# Patient Record
Sex: FEMALE | Race: AMERICAN INDIAN OR ALASKA NATIVE | NOT HISPANIC OR LATINO | URBAN - METROPOLITAN AREA
[De-identification: names, ages, dates, MRNs, and addresses within clinical notes are randomized per-mention and may not be internally consistent; named-entity substitution may affect disease eponyms.]

---

## 2020-04-01 ENCOUNTER — INPATIENT (INPATIENT)
Facility: HOSPITAL | Age: 50
LOS: 2 days | Discharge: HOME | End: 2020-04-04
Attending: HOSPITALIST | Admitting: HOSPITALIST
Payer: MEDICAID

## 2020-04-01 VITALS
TEMPERATURE: 103 F | DIASTOLIC BLOOD PRESSURE: 87 MMHG | HEART RATE: 122 BPM | SYSTOLIC BLOOD PRESSURE: 150 MMHG | RESPIRATION RATE: 18 BRPM | OXYGEN SATURATION: 96 %

## 2020-04-01 LAB
ANION GAP SERPL CALC-SCNC: 20 MMO/L — HIGH (ref 7–14)
BUN SERPL-MCNC: 10 MG/DL — SIGNIFICANT CHANGE UP (ref 7–23)
CALCIUM SERPL-MCNC: 9.5 MG/DL — SIGNIFICANT CHANGE UP (ref 8.4–10.5)
CHLORIDE SERPL-SCNC: 90 MMOL/L — LOW (ref 98–107)
CO2 SERPL-SCNC: 21 MMOL/L — LOW (ref 22–31)
CREAT SERPL-MCNC: 0.38 MG/DL — LOW (ref 0.5–1.3)
GLUCOSE SERPL-MCNC: 283 MG/DL — HIGH (ref 70–99)
HCT VFR BLD CALC: 41.2 % — SIGNIFICANT CHANGE UP (ref 34.5–45)
HGB BLD-MCNC: 14.5 G/DL — SIGNIFICANT CHANGE UP (ref 11.5–15.5)
MCHC RBC-ENTMCNC: 29.8 PG — SIGNIFICANT CHANGE UP (ref 27–34)
MCHC RBC-ENTMCNC: 35.2 % — SIGNIFICANT CHANGE UP (ref 32–36)
MCV RBC AUTO: 84.6 FL — SIGNIFICANT CHANGE UP (ref 80–100)
NRBC # FLD: 0 K/UL — SIGNIFICANT CHANGE UP (ref 0–0)
PLATELET # BLD AUTO: 266 K/UL — SIGNIFICANT CHANGE UP (ref 150–400)
PMV BLD: 9.2 FL — SIGNIFICANT CHANGE UP (ref 7–13)
POTASSIUM SERPL-MCNC: 2.9 MMOL/L — CRITICAL LOW (ref 3.5–5.3)
POTASSIUM SERPL-SCNC: 2.9 MMOL/L — CRITICAL LOW (ref 3.5–5.3)
RBC # BLD: 4.87 M/UL — SIGNIFICANT CHANGE UP (ref 3.8–5.2)
RBC # FLD: 11.8 % — SIGNIFICANT CHANGE UP (ref 10.3–14.5)
SODIUM SERPL-SCNC: 131 MMOL/L — LOW (ref 135–145)
WBC # BLD: 8.51 K/UL — SIGNIFICANT CHANGE UP (ref 3.8–10.5)
WBC # FLD AUTO: 8.51 K/UL — SIGNIFICANT CHANGE UP (ref 3.8–10.5)

## 2020-04-01 PROCEDURE — 71045 X-RAY EXAM CHEST 1 VIEW: CPT | Mod: 26

## 2020-04-01 RX ORDER — POTASSIUM CHLORIDE 20 MEQ
10 PACKET (EA) ORAL
Refills: 0 | Status: COMPLETED | OUTPATIENT
Start: 2020-04-01 | End: 2020-04-01

## 2020-04-01 RX ORDER — ACETAMINOPHEN 500 MG
650 TABLET ORAL ONCE
Refills: 0 | Status: COMPLETED | OUTPATIENT
Start: 2020-04-01 | End: 2020-04-01

## 2020-04-01 RX ORDER — AZITHROMYCIN 500 MG/1
1 TABLET, FILM COATED ORAL
Qty: 5 | Refills: 0
Start: 2020-04-01 | End: 2020-04-05

## 2020-04-01 RX ORDER — POTASSIUM CHLORIDE 20 MEQ
40 PACKET (EA) ORAL ONCE
Refills: 0 | Status: COMPLETED | OUTPATIENT
Start: 2020-04-01 | End: 2020-04-01

## 2020-04-01 RX ORDER — SODIUM CHLORIDE 9 MG/ML
1000 INJECTION INTRAMUSCULAR; INTRAVENOUS; SUBCUTANEOUS ONCE
Refills: 0 | Status: COMPLETED | OUTPATIENT
Start: 2020-04-01 | End: 2020-04-01

## 2020-04-01 RX ADMIN — Medication 100 MILLIEQUIVALENT(S): at 21:52

## 2020-04-01 RX ADMIN — Medication 40 MILLIEQUIVALENT(S): at 16:21

## 2020-04-01 RX ADMIN — SODIUM CHLORIDE 1000 MILLILITER(S): 9 INJECTION INTRAMUSCULAR; INTRAVENOUS; SUBCUTANEOUS at 12:35

## 2020-04-01 RX ADMIN — Medication 100 MILLIEQUIVALENT(S): at 18:16

## 2020-04-01 RX ADMIN — Medication 100 MILLIEQUIVALENT(S): at 16:21

## 2020-04-01 RX ADMIN — Medication 650 MILLIGRAM(S): at 12:22

## 2020-04-01 NOTE — ED ADULT TRIAGE NOTE - CHIEF COMPLAINT QUOTE
fever cough body aches x one week  Feeling worse now, also dizzy and headache  Took advil at 10 am  Type 2 DM takes Metformin

## 2020-04-01 NOTE — ED ADULT NURSE NOTE - NSIMPLEMENTINTERV_GEN_ALL_ED
Implemented All Fall Risk Interventions:  [place] to call system. Call bell, personal items and telephone within reach. Instruct patient to call for assistance. Room bathroom lighting operational. Non-slip footwear when patient is off stretcher. Physically safe environment: no spills, clutter or unnecessary equipment. Stretcher in lowest position, wheels locked, appropriate side rails in place. Provide visual cue, wrist band, yellow gown, etc. Monitor gait and stability. Monitor for mental status changes and reorient to person, place, and time. Review medications for side effects contributing to fall risk. Reinforce activity limits and safety measures with patient and family.

## 2020-04-01 NOTE — ED PROVIDER NOTE - PROGRESS NOTE DETAILS
transfer to adult side for k k-2.9  zithromax sent over to pharmacy SOLO Epstein: received sign out for Dr Reaves, pt with low K needs repletion and likely discharge home. SOLO Epstein: pt signed out to Dr Sibley to repeat labs after K runs and reassess. Patient noted to be tachypnic to 35-40, tachycardic to 110, and hypoxic to 90-91% on room air. Improved to 100% on 2-3 L NC. Given presentation, CXR, and vitals - likely infectious, COVID? Will admit for further care  Chavo Sibley MD, PGY3 Emergency Medicine

## 2020-04-01 NOTE — ED ADULT NURSE NOTE - OBJECTIVE STATEMENT
Pt presenting to ER AxOx4 ambulatory at baseline with c.o weakness, cough, SOB. PT states she was tested for covid-19 but has not received results back. On arrival to ED pt's breathing is even and unlabored. Pt receiving IV potassium as per MD orders. Pt NSR on cardiac monitor. MD at bedside, will continue to monitor.

## 2020-04-01 NOTE — ED PROVIDER NOTE - OBJECTIVE STATEMENT
50 y/o female with h/o DM presents with fever for one week, cough and diarrhea  no sick contacts  dec po but joseph fluids  c/o feeling week

## 2020-04-02 DIAGNOSIS — E87.1 HYPO-OSMOLALITY AND HYPONATREMIA: ICD-10-CM

## 2020-04-02 DIAGNOSIS — Z29.9 ENCOUNTER FOR PROPHYLACTIC MEASURES, UNSPECIFIED: ICD-10-CM

## 2020-04-02 DIAGNOSIS — Z02.9 ENCOUNTER FOR ADMINISTRATIVE EXAMINATIONS, UNSPECIFIED: ICD-10-CM

## 2020-04-02 DIAGNOSIS — E11.9 TYPE 2 DIABETES MELLITUS WITHOUT COMPLICATIONS: ICD-10-CM

## 2020-04-02 DIAGNOSIS — R05 COUGH: ICD-10-CM

## 2020-04-02 DIAGNOSIS — E87.6 HYPOKALEMIA: ICD-10-CM

## 2020-04-02 DIAGNOSIS — J96.91 RESPIRATORY FAILURE, UNSPECIFIED WITH HYPOXIA: ICD-10-CM

## 2020-04-02 DIAGNOSIS — R68.89 OTHER GENERAL SYMPTOMS AND SIGNS: ICD-10-CM

## 2020-04-02 LAB
ALBUMIN SERPL ELPH-MCNC: 3.2 G/DL — LOW (ref 3.3–5)
ALP SERPL-CCNC: 44 U/L — SIGNIFICANT CHANGE UP (ref 40–120)
ALT FLD-CCNC: 52 U/L — HIGH (ref 4–33)
ANION GAP SERPL CALC-SCNC: 15 MMO/L — HIGH (ref 7–14)
ANION GAP SERPL CALC-SCNC: 19 MMO/L — HIGH (ref 7–14)
APTT BLD: 25.7 SEC — LOW (ref 27.5–36.3)
AST SERPL-CCNC: 36 U/L — HIGH (ref 4–32)
BASOPHILS # BLD AUTO: 0.01 K/UL — SIGNIFICANT CHANGE UP (ref 0–0.2)
BASOPHILS NFR BLD AUTO: 0.1 % — SIGNIFICANT CHANGE UP (ref 0–2)
BASOPHILS NFR SPEC: 0.9 % — SIGNIFICANT CHANGE UP (ref 0–2)
BILIRUB SERPL-MCNC: 0.4 MG/DL — SIGNIFICANT CHANGE UP (ref 0.2–1.2)
BLASTS # FLD: 0 % — SIGNIFICANT CHANGE UP (ref 0–0)
BUN SERPL-MCNC: 9 MG/DL — SIGNIFICANT CHANGE UP (ref 7–23)
BUN SERPL-MCNC: 9 MG/DL — SIGNIFICANT CHANGE UP (ref 7–23)
BURR CELLS BLD QL SMEAR: PRESENT — SIGNIFICANT CHANGE UP
CALCIUM SERPL-MCNC: 8.2 MG/DL — LOW (ref 8.4–10.5)
CALCIUM SERPL-MCNC: 8.5 MG/DL — SIGNIFICANT CHANGE UP (ref 8.4–10.5)
CHLORIDE SERPL-SCNC: 96 MMOL/L — LOW (ref 98–107)
CHLORIDE SERPL-SCNC: 98 MMOL/L — SIGNIFICANT CHANGE UP (ref 98–107)
CK SERPL-CCNC: 43 U/L — SIGNIFICANT CHANGE UP (ref 25–170)
CO2 SERPL-SCNC: 13 MMOL/L — LOW (ref 22–31)
CO2 SERPL-SCNC: 18 MMOL/L — LOW (ref 22–31)
CREAT SERPL-MCNC: 0.32 MG/DL — LOW (ref 0.5–1.3)
CREAT SERPL-MCNC: 0.33 MG/DL — LOW (ref 0.5–1.3)
CRP SERPL-MCNC: 123.1 MG/L — HIGH
D DIMER BLD IA.RAPID-MCNC: 321 NG/ML — SIGNIFICANT CHANGE UP
EOSINOPHIL # BLD AUTO: 0 K/UL — SIGNIFICANT CHANGE UP (ref 0–0.5)
EOSINOPHIL NFR BLD AUTO: 0 % — SIGNIFICANT CHANGE UP (ref 0–6)
EOSINOPHIL NFR FLD: 0 % — SIGNIFICANT CHANGE UP (ref 0–6)
ERYTHROCYTE [SEDIMENTATION RATE] IN BLOOD: 86 MM/HR — HIGH (ref 4–25)
FERRITIN SERPL-MCNC: 457.4 NG/ML — HIGH (ref 15–150)
GIANT PLATELETS BLD QL SMEAR: PRESENT — SIGNIFICANT CHANGE UP
GLUCOSE BLDC GLUCOMTR-MCNC: 267 MG/DL — HIGH (ref 70–99)
GLUCOSE BLDC GLUCOMTR-MCNC: 274 MG/DL — HIGH (ref 70–99)
GLUCOSE SERPL-MCNC: 234 MG/DL — HIGH (ref 70–99)
GLUCOSE SERPL-MCNC: 250 MG/DL — HIGH (ref 70–99)
HBA1C BLD-MCNC: 11.4 % — HIGH (ref 4–5.6)
HCT VFR BLD CALC: 37.3 % — SIGNIFICANT CHANGE UP (ref 34.5–45)
HGB BLD-MCNC: 13 G/DL — SIGNIFICANT CHANGE UP (ref 11.5–15.5)
IMM GRANULOCYTES NFR BLD AUTO: 0.6 % — SIGNIFICANT CHANGE UP (ref 0–1.5)
INR BLD: 1.42 — HIGH (ref 0.88–1.17)
LDH SERPL L TO P-CCNC: 366 U/L — HIGH (ref 135–225)
LYMPHOCYTES # BLD AUTO: 1.23 K/UL — SIGNIFICANT CHANGE UP (ref 1–3.3)
LYMPHOCYTES # BLD AUTO: 13.9 % — SIGNIFICANT CHANGE UP (ref 13–44)
LYMPHOCYTES NFR SPEC AUTO: 2.6 % — LOW (ref 13–44)
MACROCYTES BLD QL: SLIGHT — SIGNIFICANT CHANGE UP
MAGNESIUM SERPL-MCNC: 1.9 MG/DL — SIGNIFICANT CHANGE UP (ref 1.6–2.6)
MCHC RBC-ENTMCNC: 30 PG — SIGNIFICANT CHANGE UP (ref 27–34)
MCHC RBC-ENTMCNC: 34.9 % — SIGNIFICANT CHANGE UP (ref 32–36)
MCV RBC AUTO: 85.9 FL — SIGNIFICANT CHANGE UP (ref 80–100)
METAMYELOCYTES # FLD: 0 % — SIGNIFICANT CHANGE UP (ref 0–1)
MICROCYTES BLD QL: SIGNIFICANT CHANGE UP
MONOCYTES # BLD AUTO: 0.36 K/UL — SIGNIFICANT CHANGE UP (ref 0–0.9)
MONOCYTES NFR BLD AUTO: 4.1 % — SIGNIFICANT CHANGE UP (ref 2–14)
MONOCYTES NFR BLD: 7 % — SIGNIFICANT CHANGE UP (ref 2–9)
MYELOCYTES NFR BLD: 0 % — SIGNIFICANT CHANGE UP (ref 0–0)
NEUTROPHIL AB SER-ACNC: 81.7 % — HIGH (ref 43–77)
NEUTROPHILS # BLD AUTO: 7.21 K/UL — SIGNIFICANT CHANGE UP (ref 1.8–7.4)
NEUTROPHILS NFR BLD AUTO: 81.3 % — HIGH (ref 43–77)
NEUTS BAND # BLD: 1.7 % — SIGNIFICANT CHANGE UP (ref 0–6)
NRBC # FLD: 0 K/UL — SIGNIFICANT CHANGE UP (ref 0–0)
OTHER - HEMATOLOGY %: 0 — SIGNIFICANT CHANGE UP
PHOSPHATE SERPL-MCNC: 1.3 MG/DL — LOW (ref 2.5–4.5)
PLATELET # BLD AUTO: 264 K/UL — SIGNIFICANT CHANGE UP (ref 150–400)
PLATELET COUNT - ESTIMATE: NORMAL — SIGNIFICANT CHANGE UP
PMV BLD: 9.1 FL — SIGNIFICANT CHANGE UP (ref 7–13)
POLYCHROMASIA BLD QL SMEAR: SLIGHT — SIGNIFICANT CHANGE UP
POTASSIUM SERPL-MCNC: 3.1 MMOL/L — LOW (ref 3.5–5.3)
POTASSIUM SERPL-MCNC: 4.7 MMOL/L — SIGNIFICANT CHANGE UP (ref 3.5–5.3)
POTASSIUM SERPL-SCNC: 3.1 MMOL/L — LOW (ref 3.5–5.3)
POTASSIUM SERPL-SCNC: 4.7 MMOL/L — SIGNIFICANT CHANGE UP (ref 3.5–5.3)
PROCALCITONIN SERPL-MCNC: 0.26 NG/ML — HIGH (ref 0.02–0.1)
PROMYELOCYTES # FLD: 0 % — SIGNIFICANT CHANGE UP (ref 0–0)
PROT SERPL-MCNC: 7 G/DL — SIGNIFICANT CHANGE UP (ref 6–8.3)
PROTHROM AB SERPL-ACNC: 16.5 SEC — HIGH (ref 9.8–13.1)
RBC # BLD: 4.34 M/UL — SIGNIFICANT CHANGE UP (ref 3.8–5.2)
RBC # FLD: 11.9 % — SIGNIFICANT CHANGE UP (ref 10.3–14.5)
REVIEW TO FOLLOW: YES — SIGNIFICANT CHANGE UP
SARS-COV-2 RNA SPEC QL NAA+PROBE: DETECTED
SODIUM SERPL-SCNC: 129 MMOL/L — LOW (ref 135–145)
SODIUM SERPL-SCNC: 130 MMOL/L — LOW (ref 135–145)
TROPONIN T, HIGH SENSITIVITY: < 6 NG/L — SIGNIFICANT CHANGE UP (ref ?–14)
VARIANT LYMPHS # BLD: 6.1 % — SIGNIFICANT CHANGE UP
WBC # BLD: 8.86 K/UL — SIGNIFICANT CHANGE UP (ref 3.8–10.5)
WBC # FLD AUTO: 8.86 K/UL — SIGNIFICANT CHANGE UP (ref 3.8–10.5)

## 2020-04-02 PROCEDURE — 99233 SBSQ HOSP IP/OBS HIGH 50: CPT

## 2020-04-02 RX ORDER — HYDROXYCHLOROQUINE SULFATE 200 MG
TABLET ORAL
Refills: 0 | Status: DISCONTINUED | OUTPATIENT
Start: 2020-04-02 | End: 2020-04-04

## 2020-04-02 RX ORDER — POTASSIUM PHOSPHATE, MONOBASIC POTASSIUM PHOSPHATE, DIBASIC 236; 224 MG/ML; MG/ML
15 INJECTION, SOLUTION INTRAVENOUS ONCE
Refills: 0 | Status: COMPLETED | OUTPATIENT
Start: 2020-04-02 | End: 2020-04-02

## 2020-04-02 RX ORDER — ACETAMINOPHEN 500 MG
650 TABLET ORAL ONCE
Refills: 0 | Status: DISCONTINUED | OUTPATIENT
Start: 2020-04-02 | End: 2020-04-04

## 2020-04-02 RX ORDER — DEXTROSE 50 % IN WATER 50 %
15 SYRINGE (ML) INTRAVENOUS ONCE
Refills: 0 | Status: DISCONTINUED | OUTPATIENT
Start: 2020-04-02 | End: 2020-04-04

## 2020-04-02 RX ORDER — INSULIN GLARGINE 100 [IU]/ML
5 INJECTION, SOLUTION SUBCUTANEOUS AT BEDTIME
Refills: 0 | Status: DISCONTINUED | OUTPATIENT
Start: 2020-04-02 | End: 2020-04-03

## 2020-04-02 RX ORDER — HYDROXYCHLOROQUINE SULFATE 200 MG
400 TABLET ORAL EVERY 12 HOURS
Refills: 0 | Status: COMPLETED | OUTPATIENT
Start: 2020-04-02 | End: 2020-04-03

## 2020-04-02 RX ORDER — DEXTROSE 50 % IN WATER 50 %
25 SYRINGE (ML) INTRAVENOUS ONCE
Refills: 0 | Status: DISCONTINUED | OUTPATIENT
Start: 2020-04-02 | End: 2020-04-04

## 2020-04-02 RX ORDER — DEXTROSE 50 % IN WATER 50 %
12.5 SYRINGE (ML) INTRAVENOUS ONCE
Refills: 0 | Status: DISCONTINUED | OUTPATIENT
Start: 2020-04-02 | End: 2020-04-04

## 2020-04-02 RX ORDER — HYDROXYCHLOROQUINE SULFATE 200 MG
200 TABLET ORAL EVERY 12 HOURS
Refills: 0 | Status: DISCONTINUED | OUTPATIENT
Start: 2020-04-03 | End: 2020-04-04

## 2020-04-02 RX ORDER — POTASSIUM CHLORIDE 20 MEQ
40 PACKET (EA) ORAL EVERY 4 HOURS
Refills: 0 | Status: COMPLETED | OUTPATIENT
Start: 2020-04-02 | End: 2020-04-02

## 2020-04-02 RX ORDER — GLUCAGON INJECTION, SOLUTION 0.5 MG/.1ML
1 INJECTION, SOLUTION SUBCUTANEOUS ONCE
Refills: 0 | Status: DISCONTINUED | OUTPATIENT
Start: 2020-04-02 | End: 2020-04-04

## 2020-04-02 RX ORDER — SODIUM CHLORIDE 9 MG/ML
1000 INJECTION, SOLUTION INTRAVENOUS
Refills: 0 | Status: DISCONTINUED | OUTPATIENT
Start: 2020-04-02 | End: 2020-04-04

## 2020-04-02 RX ORDER — ACETAMINOPHEN 500 MG
650 TABLET ORAL EVERY 6 HOURS
Refills: 0 | Status: DISCONTINUED | OUTPATIENT
Start: 2020-04-02 | End: 2020-04-04

## 2020-04-02 RX ORDER — ALBUTEROL 90 UG/1
2 AEROSOL, METERED ORAL EVERY 6 HOURS
Refills: 0 | Status: DISCONTINUED | OUTPATIENT
Start: 2020-04-02 | End: 2020-04-04

## 2020-04-02 RX ORDER — INSULIN LISPRO 100/ML
VIAL (ML) SUBCUTANEOUS
Refills: 0 | Status: DISCONTINUED | OUTPATIENT
Start: 2020-04-02 | End: 2020-04-04

## 2020-04-02 RX ORDER — ENOXAPARIN SODIUM 100 MG/ML
40 INJECTION SUBCUTANEOUS DAILY
Refills: 0 | Status: DISCONTINUED | OUTPATIENT
Start: 2020-04-02 | End: 2020-04-04

## 2020-04-02 RX ADMIN — ENOXAPARIN SODIUM 40 MILLIGRAM(S): 100 INJECTION SUBCUTANEOUS at 11:36

## 2020-04-02 RX ADMIN — POTASSIUM PHOSPHATE, MONOBASIC POTASSIUM PHOSPHATE, DIBASIC 62.5 MILLIMOLE(S): 236; 224 INJECTION, SOLUTION INTRAVENOUS at 11:36

## 2020-04-02 RX ADMIN — ALBUTEROL 2 PUFF(S): 90 AEROSOL, METERED ORAL at 16:16

## 2020-04-02 RX ADMIN — ALBUTEROL 2 PUFF(S): 90 AEROSOL, METERED ORAL at 22:46

## 2020-04-02 RX ADMIN — Medication 400 MILLIGRAM(S): at 16:16

## 2020-04-02 RX ADMIN — Medication 650 MILLIGRAM(S): at 06:57

## 2020-04-02 RX ADMIN — Medication 3: at 17:46

## 2020-04-02 RX ADMIN — INSULIN GLARGINE 5 UNIT(S): 100 INJECTION, SOLUTION SUBCUTANEOUS at 23:57

## 2020-04-02 RX ADMIN — Medication 2: at 08:55

## 2020-04-02 RX ADMIN — ALBUTEROL 2 PUFF(S): 90 AEROSOL, METERED ORAL at 11:40

## 2020-04-02 RX ADMIN — Medication 40 MILLIEQUIVALENT(S): at 16:16

## 2020-04-02 RX ADMIN — Medication 3: at 12:46

## 2020-04-02 RX ADMIN — Medication 100 MILLIGRAM(S): at 11:41

## 2020-04-02 RX ADMIN — Medication 40 MILLIEQUIVALENT(S): at 11:36

## 2020-04-02 RX ADMIN — Medication 100 MILLIGRAM(S): at 22:46

## 2020-04-02 NOTE — PROGRESS NOTE ADULT - SUBJECTIVE AND OBJECTIVE BOX
Patient is a 49y old  Female who presents with a chief complaint of Fevers (02 Apr 2020 06:01)    SUBJECTIVE / OVERNIGHT EVENTS: Feels better. Still reports cough, shortness of breath and diarrhea. Also febrile overnight to 100.4     MEDICATIONS  (STANDING):  acetaminophen   Tablet .. 650 milliGRAM(s) Oral once  ALBUTerol    90 MICROgram(s) HFA Inhaler 2 Puff(s) Inhalation every 6 hours  dextrose 5%. 1000 milliLiter(s) (50 mL/Hr) IV Continuous <Continuous>  dextrose 50% Injectable 12.5 Gram(s) IV Push once  dextrose 50% Injectable 25 Gram(s) IV Push once  dextrose 50% Injectable 25 Gram(s) IV Push once  enoxaparin Injectable 40 milliGRAM(s) SubCutaneous daily  insulin lispro (HumaLOG) corrective regimen sliding scale   SubCutaneous three times a day before meals  potassium chloride    Tablet ER 40 milliEquivalent(s) Oral every 4 hours    MEDICATIONS  (PRN):  acetaminophen   Tablet .. 650 milliGRAM(s) Oral every 6 hours PRN Temp greater or equal to 38C (100.4F)  benzonatate 100 milliGRAM(s) Oral three times a day PRN Cough  dextrose 40% Gel 15 Gram(s) Oral once PRN Blood Glucose LESS THAN 70 milliGRAM(s)/deciliter  glucagon  Injectable 1 milliGRAM(s) IntraMuscular once PRN Glucose LESS THAN 70 milligrams/deciliter      Vital Signs Last 24 Hrs  T(C): 38 (02 Apr 2020 05:39), Max: 38.1 (02 Apr 2020 02:32)  T(F): 100.4 (02 Apr 2020 05:39), Max: 100.5 (02 Apr 2020 02:32)  HR: 110 (02 Apr 2020 05:39) (95 - 110)  BP: 156/88 (02 Apr 2020 05:39) (132/80 - 160/68)  BP(mean): --  RR: 26 (02 Apr 2020 05:39) (18 - 38)  SpO2: 95% (02 Apr 2020 05:39) (90% - 99%)    POCT Blood Glucose.: 273 mg/dL (02 Apr 2020 12:08)  POCT Blood Glucose.: 212 mg/dL (02 Apr 2020 08:36)    I&O's Summary      PHYSICAL EXAM:  GENERAL: NAD, well-developed  HEAD:  Atraumatic, Normocephalic  EYES: EOMI, PERRLA, conjunctiva and sclera clear  NECK: Supple, No JVD  CHEST/LUNG: basilar rales, no wheeze, no accessary muscle use  HEART: Regular rate and rhythm; No murmurs, rubs, or gallops  ABDOMEN: Soft, Nontender, Nondistended; Bowel sounds present  EXTREMITIES:  2+ Peripheral Pulses, No clubbing, cyanosis, or edema  PSYCH: AAOx3  NEUROLOGY: non-focal  SKIN: No rashes or lesions    LABS:                        13.0   8.86  )-----------( 264      ( 02 Apr 2020 07:22 )             37.3     04-02    129<L>  |  96<L>  |  9   ----------------------------<  234<H>  3.1<L>   |  18<L>  |  0.33<L>    Ca    8.5      02 Apr 2020 07:22  Phos  1.3     04-02  Mg     1.9     04-02    TPro  7.0  /  Alb  3.2<L>  /  TBili  0.4  /  DBili  x   /  AST  36<H>  /  ALT  52<H>  /  AlkPhos  44  04-02    PT/INR - ( 02 Apr 2020 00:58 )   PT: 16.5 SEC;   INR: 1.42          PTT - ( 02 Apr 2020 00:58 )  PTT:25.7 SEC  CARDIAC MARKERS ( 02 Apr 2020 00:55 )  x     / x     / 43 u/L / x     / x              RADIOLOGY & ADDITIONAL TESTS:    Imaging Personally Reviewed: < from: Xray Chest 1 View- PORTABLE-Urgent (04.01.20 @ 13:15) >  Bibasilar opacities may represent covid 19 infection.    < end of copied text >      Consultant(s) Notes Reviewed:      Care Discussed with Consultants/Other Providers:    Assessment and Plan:

## 2020-04-02 NOTE — H&P ADULT - HISTORY OF PRESENT ILLNESS
48 y/o female with h/o non-insulin dependent T2DM presents with fever for one week, weakness, cough and diarrhea.  Pt has decreased appetite but can tolerate fluids. No sick contacts    ED triage vitals: 150/87, , T 102.8 RR 18 w/ SpO2 96% on RA  CXR showed b/l patchy opacities   Labs consistent w/ COVID infection 48 y/o female with h/o non-insulin dependent T2DM presents with fever for one week, weakness, cough and diarrhea.  Pt has decreased appetite but can tolerate fluids. No sick contacts    ED triage vitals: 150/87, , T 102.8 RR 18 w/ SpO2 96% on RA, now 93% on 5L  CXR showed b/l patchy opacities   Labs consistent w/ COVID infection

## 2020-04-02 NOTE — PROGRESS NOTE ADULT - PROBLEM SELECTOR PLAN 3
- on metformin at home  -hgbA1c pending  -HISS for now -suspect from low PO intake  -s/p 1 L IVF in ED  -encourage PO intake  -monitor bmp  -hold off on further fluids given concern for ARDS in suspected COVID PNA

## 2020-04-02 NOTE — PROGRESS NOTE ADULT - PROBLEM SELECTOR PLAN 4
- DVT ppx: lovenox SQ 40   - Diet: consistent carb -likely 2/2 diarrhea and decreased PO intake  -supplement  -trend BMP

## 2020-04-02 NOTE — H&P ADULT - PROBLEM SELECTOR PLAN 2
- f/u COVID swab  - COVID labs ordered  - no indication for Plaquenil, Azithro or Solumedrol - f/u COVID swab  - COVID labs ordered  - no indication for Plaquenil, Azithro or Solumedrol at this time  - Tylenol PRN for fevers  - Tessalon perle PRN for cough  - monitor respiratory status closely

## 2020-04-02 NOTE — H&P ADULT - NSHPREVIEWOFSYSTEMS_GEN_ALL_CORE
· CONSTITUTIONAL: - - -  · Constitutional [+]: FEVER  · CARDIOVASCULAR: no chest pain and no edema.  · RESPIRATORY: - - -  · Respiratory [+]: COUGH  · GASTROINTESTINAL: - - -  · Gastrointestinal [+]: DIARRHEA · CONSTITUTIONAL: - - -  · Constitutional [+]: FEVER  · CARDIOVASCULAR: no chest pain and no edema.  · RESPIRATORY: - - -  · Respiratory [+]: COUGH  · GASTROINTESTINAL: - - -  · Gastrointestinal [+]: DIARRHEA  : no dysuria, increased frequency

## 2020-04-02 NOTE — H&P ADULT - PROBLEM SELECTOR PLAN 3
- on metformin at home  - c/w sliding scale w/ q8 fingersticks - on metformin at home  - c/w sliding scale w/ FS checks before lunch and dinner, monitor AM BMP glucose

## 2020-04-02 NOTE — H&P ADULT - NSHPLABSRESULTS_GEN_ALL_CORE
LABS:                        14.5   8.51  )-----------( 266      ( 01 Apr 2020 13:00 )             41.2     04-01    130<L>  |  98  |  9   ----------------------------<  250<H>  4.7   |  13<L>  |  0.32<L>    Ca    8.2<L>      01 Apr 2020 20:50      PT/INR - ( 02 Apr 2020 00:58 )   PT: 16.5 SEC;   INR: 1.42          PTT - ( 02 Apr 2020 00:58 )  PTT:25.7 SEC  CARDIAC MARKERS ( 02 Apr 2020 00:55 )  x     / x     / 43 u/L / x     / x        Lactate Dehydrogenase, Serum: 366 U/L (04.02.20 @ 00:55)  Ferritin, Serum: 457.4 ng/mL (04.02.20 @ 00:55)  C-Reactive Protein, Serum: 123.1 mg/L (04.02.20 @ 00:58)  Sedimentation Rate, Erythrocyte: 86 mm/hr (04.02.20 @ 00:58)  Procalcitonin, Serum (04.02.20 @ 00:55)    Procalcitonin, Serum: 0.26    < from: Xray Chest 1 View- PORTABLE-Urgent (04.01.20 @ 13:15) >    IMPRESSION:  Bibasilar opacities may represent covid 19 infection.    < end of copied text >            RADIOLOGY & ADDITIONAL TESTS:  Results Reviewed:  yes   Imaging Personally Reviewed: yes   Electrocardiogram Personally Reviewed:    COORDINATION OF CARE:  Care Discussed with Consultants/Other Providers [Y/N]:  Prior or Outpatient Records Reviewed [Y/N]: LABS:                        14.5   8.51  )-----------( 266      ( 01 Apr 2020 13:00 )             41.2     04-01    130<L>  |  98  |  9   ----------------------------<  250<H>  4.7   |  13<L>  |  0.32<L>    Ca    8.2<L>      01 Apr 2020 20:50      PT/INR - ( 02 Apr 2020 00:58 )   PT: 16.5 SEC;   INR: 1.42          PTT - ( 02 Apr 2020 00:58 )  PTT:25.7 SEC  CARDIAC MARKERS ( 02 Apr 2020 00:55 )  x     / x     / 43 u/L / x     / x        Lactate Dehydrogenase, Serum: 366 U/L (04.02.20 @ 00:55)  Ferritin, Serum: 457.4 ng/mL (04.02.20 @ 00:55)  C-Reactive Protein, Serum: 123.1 mg/L (04.02.20 @ 00:58)  Sedimentation Rate, Erythrocyte: 86 mm/hr (04.02.20 @ 00:58)  Procalcitonin, Serum (04.02.20 @ 00:55)    Procalcitonin, Serum: 0.26    < from: Xray Chest 1 View- PORTABLE-Urgent (04.01.20 @ 13:15) >    IMPRESSION:  Bibasilar opacities may represent covid 19 infection.    < end of copied text >            RADIOLOGY & ADDITIONAL TESTS:  Results Reviewed:  yes   Imaging Personally Reviewed: yes   Electrocardiogram Personally Reviewed: no     COORDINATION OF CARE:  Care Discussed with Consultants/Other Providers [Y/N]:  Prior or Outpatient Records Reviewed [Y/N]: LABS:                        14.5   8.51  )-----------( 266      ( 01 Apr 2020 13:00 )             41.2     04-01    130<L>  |  98  |  9   ----------------------------<  250<H>  4.7   |  13<L>  |  0.32<L>    Ca    8.2<L>      01 Apr 2020 20:50      PT/INR - ( 02 Apr 2020 00:58 )   PT: 16.5 SEC;   INR: 1.42          PTT - ( 02 Apr 2020 00:58 )  PTT:25.7 SEC  CARDIAC MARKERS ( 02 Apr 2020 00:55 )  x     / x     / 43 u/L / x     / x        Lactate Dehydrogenase, Serum: 366 U/L (04.02.20 @ 00:55)  Ferritin, Serum: 457.4 ng/mL (04.02.20 @ 00:55)  C-Reactive Protein, Serum: 123.1 mg/L (04.02.20 @ 00:58)  Sedimentation Rate, Erythrocyte: 86 mm/hr (04.02.20 @ 00:58)  Procalcitonin, Serum (04.02.20 @ 00:55)    Procalcitonin, Serum: 0.26    < from: Xray Chest 1 View- PORTABLE-Urgent (04.01.20 @ 13:15) >    IMPRESSION:  Bibasilar opacities may represent covid 19 infection.    < end of copied text >      RADIOLOGY & ADDITIONAL TESTS:  Results Reviewed:  yes   Imaging Personally Reviewed: yes   Electrocardiogram Personally Reviewed: no     COORDINATION OF CARE:  Care Discussed with Consultants/Other Providers [Y/N]:  Prior or Outpatient Records Reviewed [Y/N]:    EKG:  NSR, QTc 445 ms

## 2020-04-02 NOTE — PROGRESS NOTE ADULT - PROBLEM SELECTOR PLAN 5
Transitions of Care Status:  1.  Name of PCP:  2.  PCP Contacted on Admission: [ ] Y    [ ] N    3.  PCP contacted at Discharge: [ ] Y    [ ] N    [ ] N/A  4.  Post-Discharge Appointment Date and Location:  5.  Summary of Handoff given to PCP: -DM2 on metformin at home  -hgbA1c pending  -HISS for now

## 2020-04-02 NOTE — PROGRESS NOTE ADULT - PROBLEM SELECTOR PLAN 1
- f/u COVID swab, results pending however high suspicion  -given hypoxia and high suspicion for covid infection, would start hydroxychloroquine  - Tylenol PRN for fevers  - Tessalon perle PRN for cough  - monitor respiratory status closely  -c/w NC for 02 supplementation for now. If worsening hypoxia, titrate up.   -no nebs/bipap/highflow

## 2020-04-02 NOTE — H&P ADULT - NSHPPHYSICALEXAM_GEN_ALL_CORE
PHYSICAL EXAM:  Vital Signs Last 24 Hrs  T(C): 38 (02 Apr 2020 05:39), Max: 39.3 (01 Apr 2020 11:39)  T(F): 100.4 (02 Apr 2020 05:39), Max: 102.8 (01 Apr 2020 11:39)  HR: 110 (02 Apr 2020 05:39) (95 - 122)  BP: 156/88 (02 Apr 2020 05:39) (132/80 - 160/68)  BP(mean): --  RR: 26 (02 Apr 2020 05:39) (18 - 38)  SpO2: 95% (02 Apr 2020 05:39) (90% - 99%)    · Physical Examination: mouth dry  · CARDIAC: Normal rate, regular rhythm.  Heart sounds S1, S2.  No murmurs, rubs or gallops.  · RESPIRATORY: dec bs at bases b/l  · GASTROINTESTINAL: Abdomen soft, non-tender, no guarding.

## 2020-04-02 NOTE — H&P ADULT - ASSESSMENT
50 y/o female with h/o non-insulin dependent T2DM admitted for hypoxic respiratory failure likely 2/2 COVID infection

## 2020-04-02 NOTE — H&P ADULT - PROBLEM SELECTOR PLAN 1
- c/w supplemental O2 to maintain SpO2 > 92%  - no indication for steroids or azithro at this time   - c/w albuterol inhaler standing q6; pt can refuse if not needed - c/w supplemental O2 to maintain SpO2 > 92%, avoid HFNC and NPPV given suspicion for COVID  - no indication for steroids or azithro at this time   - c/w albuterol inhaler standing q6h; pt can refuse if not needed

## 2020-04-03 ENCOUNTER — TRANSCRIPTION ENCOUNTER (OUTPATIENT)
Age: 50
End: 2020-04-03

## 2020-04-03 DIAGNOSIS — B97.21 SARS-ASSOCIATED CORONAVIRUS AS THE CAUSE OF DISEASES CLASSIFIED ELSEWHERE: ICD-10-CM

## 2020-04-03 LAB
ANION GAP SERPL CALC-SCNC: 14 MMO/L — SIGNIFICANT CHANGE UP (ref 7–14)
BUN SERPL-MCNC: 8 MG/DL — SIGNIFICANT CHANGE UP (ref 7–23)
CALCIUM SERPL-MCNC: 8.8 MG/DL — SIGNIFICANT CHANGE UP (ref 8.4–10.5)
CHLORIDE SERPL-SCNC: 94 MMOL/L — LOW (ref 98–107)
CO2 SERPL-SCNC: 21 MMOL/L — LOW (ref 22–31)
CREAT SERPL-MCNC: 0.31 MG/DL — LOW (ref 0.5–1.3)
GLUCOSE BLDC GLUCOMTR-MCNC: 156 MG/DL — HIGH (ref 70–99)
GLUCOSE BLDC GLUCOMTR-MCNC: 185 MG/DL — HIGH (ref 70–99)
GLUCOSE BLDC GLUCOMTR-MCNC: 218 MG/DL — HIGH (ref 70–99)
GLUCOSE BLDC GLUCOMTR-MCNC: 237 MG/DL — HIGH (ref 70–99)
GLUCOSE SERPL-MCNC: 255 MG/DL — HIGH (ref 70–99)
MAGNESIUM SERPL-MCNC: 1.9 MG/DL — SIGNIFICANT CHANGE UP (ref 1.6–2.6)
PHOSPHATE SERPL-MCNC: 1.5 MG/DL — LOW (ref 2.5–4.5)
POTASSIUM SERPL-MCNC: 3.2 MMOL/L — LOW (ref 3.5–5.3)
POTASSIUM SERPL-SCNC: 3.2 MMOL/L — LOW (ref 3.5–5.3)
SODIUM SERPL-SCNC: 129 MMOL/L — LOW (ref 135–145)

## 2020-04-03 PROCEDURE — 99233 SBSQ HOSP IP/OBS HIGH 50: CPT

## 2020-04-03 RX ORDER — INSULIN GLARGINE 100 [IU]/ML
8 INJECTION, SOLUTION SUBCUTANEOUS AT BEDTIME
Refills: 0 | Status: DISCONTINUED | OUTPATIENT
Start: 2020-04-03 | End: 2020-04-04

## 2020-04-03 RX ORDER — SODIUM CHLORIDE 9 MG/ML
500 INJECTION INTRAMUSCULAR; INTRAVENOUS; SUBCUTANEOUS
Refills: 0 | Status: COMPLETED | OUTPATIENT
Start: 2020-04-03 | End: 2020-04-03

## 2020-04-03 RX ORDER — ACETAMINOPHEN 500 MG
650 TABLET ORAL EVERY 6 HOURS
Refills: 0 | Status: DISCONTINUED | OUTPATIENT
Start: 2020-04-03 | End: 2020-04-04

## 2020-04-03 RX ORDER — POTASSIUM CHLORIDE 20 MEQ
40 PACKET (EA) ORAL
Refills: 0 | Status: COMPLETED | OUTPATIENT
Start: 2020-04-03 | End: 2020-04-03

## 2020-04-03 RX ADMIN — Medication 40 MILLIEQUIVALENT(S): at 18:13

## 2020-04-03 RX ADMIN — Medication 100 MILLIGRAM(S): at 19:54

## 2020-04-03 RX ADMIN — Medication 2: at 08:52

## 2020-04-03 RX ADMIN — ALBUTEROL 2 PUFF(S): 90 AEROSOL, METERED ORAL at 06:31

## 2020-04-03 RX ADMIN — ALBUTEROL 2 PUFF(S): 90 AEROSOL, METERED ORAL at 22:46

## 2020-04-03 RX ADMIN — SODIUM CHLORIDE 75 MILLILITER(S): 9 INJECTION INTRAMUSCULAR; INTRAVENOUS; SUBCUTANEOUS at 14:00

## 2020-04-03 RX ADMIN — ALBUTEROL 2 PUFF(S): 90 AEROSOL, METERED ORAL at 18:13

## 2020-04-03 RX ADMIN — Medication 2: at 12:54

## 2020-04-03 RX ADMIN — ENOXAPARIN SODIUM 40 MILLIGRAM(S): 100 INJECTION SUBCUTANEOUS at 13:54

## 2020-04-03 RX ADMIN — Medication 200 MILLIGRAM(S): at 19:04

## 2020-04-03 RX ADMIN — Medication 200 MILLIGRAM(S): at 13:53

## 2020-04-03 RX ADMIN — Medication 100 MILLIGRAM(S): at 08:52

## 2020-04-03 RX ADMIN — Medication 1: at 17:54

## 2020-04-03 RX ADMIN — Medication 650 MILLIGRAM(S): at 19:58

## 2020-04-03 RX ADMIN — INSULIN GLARGINE 8 UNIT(S): 100 INJECTION, SOLUTION SUBCUTANEOUS at 22:46

## 2020-04-03 RX ADMIN — Medication 40 MILLIEQUIVALENT(S): at 12:54

## 2020-04-03 RX ADMIN — Medication 400 MILLIGRAM(S): at 00:01

## 2020-04-03 RX ADMIN — ALBUTEROL 2 PUFF(S): 90 AEROSOL, METERED ORAL at 11:30

## 2020-04-03 NOTE — DISCHARGE NOTE PROVIDER - NSFOLLOWUPCLINICS_GEN_ALL_ED_FT
[facility] [place] [facility]  Endocrinology  [address] [place] [address]  Phone: [phone]  Fax:   Follow Up Time: 2 weeks

## 2020-04-03 NOTE — PROGRESS NOTE ADULT - ASSESSMENT
48 y/o female with h/o non-insulin dependent T2DM admitted for hypoxic respiratory failure likely 2/2 COVID infection

## 2020-04-03 NOTE — PROGRESS NOTE ADULT - PROBLEM SELECTOR PLAN 1
-COVID +  -given hypoxia and covid infection, would start hydroxychloroquine  - Tylenol PRN for fevers  - Tessalon perle PRN for cough  - monitor respiratory status closely  -c/w NC for 02 supplementation for now. If worsening hypoxia, titrate up.   -no nebs/bipap/highflow

## 2020-04-03 NOTE — PROGRESS NOTE ADULT - SUBJECTIVE AND OBJECTIVE BOX
Patient is a 49y old  Female who presents with a chief complaint of Fevers (02 Apr 2020 06:01)    SUBJECTIVE / OVERNIGHT EVENTS: Feels better. Still reports cough and shortness of breath. 95% on 1L NC.       MEDICATIONS  (STANDING):  acetaminophen   Tablet .. 650 milliGRAM(s) Oral once  ALBUTerol    90 MICROgram(s) HFA Inhaler 2 Puff(s) Inhalation every 6 hours  dextrose 5%. 1000 milliLiter(s) (50 mL/Hr) IV Continuous <Continuous>  dextrose 50% Injectable 12.5 Gram(s) IV Push once  dextrose 50% Injectable 25 Gram(s) IV Push once  dextrose 50% Injectable 25 Gram(s) IV Push once  enoxaparin Injectable 40 milliGRAM(s) SubCutaneous daily  insulin lispro (HumaLOG) corrective regimen sliding scale   SubCutaneous three times a day before meals  potassium chloride    Tablet ER 40 milliEquivalent(s) Oral every 4 hours    MEDICATIONS  (PRN):  acetaminophen   Tablet .. 650 milliGRAM(s) Oral every 6 hours PRN Temp greater or equal to 38C (100.4F)  benzonatate 100 milliGRAM(s) Oral three times a day PRN Cough  dextrose 40% Gel 15 Gram(s) Oral once PRN Blood Glucose LESS THAN 70 milliGRAM(s)/deciliter  glucagon  Injectable 1 milliGRAM(s) IntraMuscular once PRN Glucose LESS THAN 70 milligrams/deciliter    Vital Signs Last 24 Hrs  T(C): 37.4 (02 Apr 2020 19:51), Max: 37.4 (02 Apr 2020 19:51)  T(F): 99.3 (02 Apr 2020 19:51), Max: 99.3 (02 Apr 2020 19:51)  HR: 110 (02 Apr 2020 19:51) (110 - 110)  BP: 151/92 (02 Apr 2020 19:51) (151/92 - 151/92)  BP(mean): --  RR: 22 (02 Apr 2020 19:51) (22 - 22)  SpO2: 95% (02 Apr 2020 19:51) (95% - 95%)    CAPILLARY BLOOD GLUCOSE      POCT Blood Glucose.: 237 mg/dL (03 Apr 2020 08:36)  POCT Blood Glucose.: 267 mg/dL (02 Apr 2020 22:58)  POCT Blood Glucose.: 274 mg/dL (02 Apr 2020 17:25)  POCT Blood Glucose.: 273 mg/dL (02 Apr 2020 12:08)      PHYSICAL EXAM:  GENERAL: NAD, well-developed  HEAD:  Atraumatic, Normocephalic  EYES: EOMI, PERRLA, conjunctiva and sclera clear  NECK: Supple, No JVD  CHEST/LUNG: basilar rales, no wheeze, no accessary muscle use  HEART: Regular rate and rhythm; No murmurs, rubs, or gallops  ABDOMEN: Soft, Nontender, Nondistended; Bowel sounds present  EXTREMITIES:  2+ Peripheral Pulses, No clubbing, cyanosis, or edema  PSYCH: AAOx3  NEUROLOGY: non-focal  SKIN: No rashes or lesions    LABS:                          13.0   8.86  )-----------( 264      ( 02 Apr 2020 07:22 )             37.3   04-03    129<L>  |  94<L>  |  8   ----------------------------<  255<H>  3.2<L>   |  21<L>  |  0.31<L>    Ca    8.8      03 Apr 2020 05:50  Phos  1.5     04-03  Mg     1.9     04-03    TPro  7.0  /  Alb  3.2<L>  /  TBili  0.4  /  DBili  x   /  AST  36<H>  /  ALT  52<H>  /  AlkPhos  44  04-02    RADIOLOGY & ADDITIONAL TESTS:    Imaging Personally Reviewed: < from: Xray Chest 1 View- PORTABLE-Urgent (04.01.20 @ 13:15) >  Bibasilar opacities may represent covid 19 infection.    < end of copied text >      Consultant(s) Notes Reviewed:      Care Discussed with Consultants/Other Providers:    Assessment and Plan:

## 2020-04-03 NOTE — PROGRESS NOTE ADULT - PROBLEM SELECTOR PLAN 3
-suspect from low PO intake  -s/p 1 L IVF in ED  -encourage PO intake  -monitor bmp  -give gentle fluid trial today

## 2020-04-03 NOTE — DISCHARGE NOTE PROVIDER - NSDCFUADDINST_GEN_ALL_CORE_FT
Patient should remain in isolation for a total of 14 days from time of discharge. Patient was diagnosed on 4/2/2020 and would be expected to complete isolation on or after 4/16/2020.    Those caring for the patient should maintain strict hand hygiene and avoid touching face as much as possible. If any family members develop shortness of breath or fevers they should contact their primary care provider as soon as possible.

## 2020-04-03 NOTE — DISCHARGE NOTE PROVIDER - NSDCCPCAREPLAN_GEN_ALL_CORE_FT
PRINCIPAL DISCHARGE DIAGNOSIS  Diagnosis: SARS-associated coronavirus infection  Assessment and Plan of Treatment: You were found to be positive COVID 19 virus. Please follow full instructions listed in your paperwork. Please self quarantine at home for 14 days from discharge and stay 6 feet away minimum from other individuals or animals. Do not go to work, school, public areas, or use public transportaion. Restrict outside activity except for  medical care. Please call ahead if you have an appointment with your doctor to inform them of your COVID positive status. Always wear a facemask at home. Cover your sneezes and coughs, and wash hands with soap and water for at least 20 seconds frequently. Avoid sharing personal household items. Clean all surfaces where you contact frequently such as coutners and doorknobs frequently.     If you have any worsening breathing, faster breathing or trouble with breathing call 911 immediately or your healthcare provider ahead of time and wear facemask before being seen by medical personel.   Take tylenol for your fevers. Please follow state and local rules and regulations regarding coming off of isolation.      SECONDARY DISCHARGE DIAGNOSES  Diagnosis: Hypoxia  Assessment and Plan of Treatment:     Diagnosis: Fever  Assessment and Plan of Treatment: PRINCIPAL DISCHARGE DIAGNOSIS  Diagnosis: SARS-associated coronavirus infection  Assessment and Plan of Treatment: You were found to be positive COVID 19 virus (4/2/2020). Please follow full instructions listed in your paperwork. Please self quarantine at home for 14 days from discharge and stay 6 feet away minimum from other individuals or animals. Do not go to work, school, public areas, or use public transportaion. Restrict outside activity except for  medical care. Please call ahead if you have an appointment with your doctor to inform them of your COVID positive status. Always wear a facemask at home. Cover your sneezes and coughs, and wash hands with soap and water for at least 20 seconds frequently. Avoid sharing personal household items. Clean all surfaces where you contact frequently such as coutners and doorknobs frequently. Continue Plaquenil as prescribed, Inhaler as needed.   If you have any worsening breathing, faster breathing or trouble with breathing call 911 immediately or your healthcare provider ahead of time and wear facemask before being seen by medical personel.   Take tylenol for your fevers. Please follow state and local rules and regulations regarding coming off of isolation.      SECONDARY DISCHARGE DIAGNOSES  Diagnosis: Hypoxia  Assessment and Plan of Treatment: resolving, in the setting of +COVID    Diagnosis: Diabetes  Assessment and Plan of Treatment: A1C 11.5%. Your blood sugars were noted to be elevated, you were started on insulin (Lantus), now your blood sugars have improved.   Continue your medication regimen (lantus and metformin) and a consistent carbohydrate diet (Meaning eating the same amount of carbohydrates at the same time each day). Monitor blood glucose levels throughout the day before meals and at bedtime. Record blood sugars and bring to outpatient providers appointment in order to be reviewed by your doctor for management modifications. If your sugars are more than 400 or less than 70 you should contact your PCP immediately. Monitor for signs/symptoms of low blood glucose, such as, dizziness, altered mental status, or cool/clammy skin. In addition, monitor for signs/symptoms of high blood glucose, such as, feeling hot, dry, fatigued, or with increased thirst/urination. Make regular podiatry appointments in order to have feet checked for wounds and uncontrolled toe nail growth to prevent infections, as well as, appointments with an ophthalmologist to monitor your vision.  **Follow up with your primary care doctor and Endocrinologist as outpatient for further recommendations and management    Diagnosis: Hypokalemia  Assessment and Plan of Treatment: noted with low potassium levels, now improved. repeat BMP in 1-2 weeks    Diagnosis: Hyponatremia  Assessment and Plan of Treatment: noted with low sodium levels, likely in setting of +covid. now improved. Avoid dehydration. repeat BMP in 1-2 weeks    Diagnosis: Fever  Assessment and Plan of Treatment: resolved, take tylenol as prescribed

## 2020-04-03 NOTE — DISCHARGE NOTE PROVIDER - HOSPITAL COURSE
50 y/o female with h/o non-insulin dependent T2DM presents with fever for one week, weakness, cough and diarrhea.  Pt has decreased appetite but can tolerate fluids. No sick contacts            CXR showed b/l patchy opacities     Labs consistent w/ COVID infection          SARS-associated coronavirus infection. -COVID +    - hydroxychloroquine    - Tylenol PRN for fevers    - Tessalon perle PRN for cough             Respiratory failure with hypoxia.     - c/w albuterol inhaler standing q6h; pt can refuse if not needed.                  Hyponatremia. suspect from low PO intake    -encourage PO intake            Hypokalemia.      -supplement            Diabetes. -DM2 on metformin at home    -hgbA1c 11.4    -will need insulin on discharge    -Increase lantus to 8 units today, c/w HISS    -insulin and diabetes teaching.        On_________, discussed with __________, patient is medically cleared and optimized for discharge today. All medications were reviewed with attending, and sent to mutually agreed upon pharmacy. 48 y/o female with h/o non-insulin dependent T2DM presents with fever for one week, weakness, cough and diarrhea.  Pt has decreased appetite but can tolerate fluids. No sick contacts. Found to be positive for COVID-19 pcr (4/2/2020). Patient s/p supplemental oxygen, no saturating well on room air 95%; ambulating RA 91-92%. A1C noted to be 11.5%, started on lantus; FS improved. Glucometer and insulin teaching provided. Patient is medically cleared for discharge home, follow up with PCP and outpatient Endo .        Suspected 2019 novel coronavirus infection.      -COVID + (4/2)    -given hypoxia and covid infection, started hydroxychloroquine     -Tylenol PRN for fevers    -Tessalon perle PRN for cough    -95% on RA and 91% on ambulation.      -no nebs/bipap/highflow     -EKG (4/1)- qtc 445ms        Respiratory failure with hypoxia.      -resolving, 95% on RA and 91% on ambulation.     -avoid HFNC and NPPV given suspicion for COVID    -c/w albuterol inhaler standing q6h prn        Hyponatremia.  suspect from low PO intake; Improved s/p 1 L IVF; encourage PO intake; monitor bmp    Hypokalemia. likely 2/2 diarrhea and decreased PO intake; trend BMP    Diabetes. on metformin at home; A1C 11.5. ISS, Started on lantus 8u qhs. Patient to be discharged on Lantus 8U qhs and home dose of metformin; Insulin/glucometer teaching provided. Follow up with PCP/Endo        Dispo: home        On 4/4/20, case was discussed with Dr. Russell, patient is medically cleared and optimized for discharge home today. All medications were reviewed with attending, and sent to mutually agreed upon pharmacy (to complete course plaquenil, DC on Lantus 8u qhs and home metformin). Meds sent and picked up from [facility], insulin/glucometer teaching was provided by RN- patient and Son in agreement with plan of care.

## 2020-04-03 NOTE — PROGRESS NOTE ADULT - PROBLEM SELECTOR PLAN 5
-DM2 on metformin at home  -hgbA1c 11.4  -will need insulin on discharge  -Increase lantus to 8 units today, c/w HISS  -insulin and diabetes teaching

## 2020-04-04 ENCOUNTER — TRANSCRIPTION ENCOUNTER (OUTPATIENT)
Age: 50
End: 2020-04-04

## 2020-04-04 VITALS
HEART RATE: 100 BPM | SYSTOLIC BLOOD PRESSURE: 158 MMHG | DIASTOLIC BLOOD PRESSURE: 87 MMHG | OXYGEN SATURATION: 93 % | RESPIRATION RATE: 20 BRPM | TEMPERATURE: 99 F

## 2020-04-04 LAB
ANION GAP SERPL CALC-SCNC: 15 MMO/L — HIGH (ref 7–14)
BUN SERPL-MCNC: 8 MG/DL — SIGNIFICANT CHANGE UP (ref 7–23)
CALCIUM SERPL-MCNC: 9.3 MG/DL — SIGNIFICANT CHANGE UP (ref 8.4–10.5)
CHLORIDE SERPL-SCNC: 98 MMOL/L — SIGNIFICANT CHANGE UP (ref 98–107)
CO2 SERPL-SCNC: 22 MMOL/L — SIGNIFICANT CHANGE UP (ref 22–31)
CREAT SERPL-MCNC: 0.36 MG/DL — LOW (ref 0.5–1.3)
GLUCOSE BLDC GLUCOMTR-MCNC: 170 MG/DL — HIGH (ref 70–99)
GLUCOSE BLDC GLUCOMTR-MCNC: 192 MG/DL — HIGH (ref 70–99)
GLUCOSE SERPL-MCNC: 190 MG/DL — HIGH (ref 70–99)
HCT VFR BLD CALC: 35 % — SIGNIFICANT CHANGE UP (ref 34.5–45)
HGB BLD-MCNC: 12.1 G/DL — SIGNIFICANT CHANGE UP (ref 11.5–15.5)
MCHC RBC-ENTMCNC: 29.4 PG — SIGNIFICANT CHANGE UP (ref 27–34)
MCHC RBC-ENTMCNC: 34.6 % — SIGNIFICANT CHANGE UP (ref 32–36)
MCV RBC AUTO: 85 FL — SIGNIFICANT CHANGE UP (ref 80–100)
NRBC # FLD: 0 K/UL — SIGNIFICANT CHANGE UP (ref 0–0)
PLATELET # BLD AUTO: 331 K/UL — SIGNIFICANT CHANGE UP (ref 150–400)
PMV BLD: 9.3 FL — SIGNIFICANT CHANGE UP (ref 7–13)
POTASSIUM SERPL-MCNC: 3.1 MMOL/L — LOW (ref 3.5–5.3)
POTASSIUM SERPL-SCNC: 3.1 MMOL/L — LOW (ref 3.5–5.3)
RBC # BLD: 4.12 M/UL — SIGNIFICANT CHANGE UP (ref 3.8–5.2)
RBC # FLD: 12 % — SIGNIFICANT CHANGE UP (ref 10.3–14.5)
SODIUM SERPL-SCNC: 135 MMOL/L — SIGNIFICANT CHANGE UP (ref 135–145)
WBC # BLD: 10.52 K/UL — HIGH (ref 3.8–10.5)
WBC # FLD AUTO: 10.52 K/UL — HIGH (ref 3.8–10.5)

## 2020-04-04 PROCEDURE — 99239 HOSP IP/OBS DSCHRG MGMT >30: CPT

## 2020-04-04 RX ORDER — ACETAMINOPHEN 500 MG
1 TABLET ORAL
Qty: 30 | Refills: 0
Start: 2020-04-04 | End: 2020-04-13

## 2020-04-04 RX ORDER — INSULIN GLARGINE 100 [IU]/ML
8 INJECTION, SOLUTION SUBCUTANEOUS
Qty: 1 | Refills: 0
Start: 2020-04-04 | End: 2020-05-03

## 2020-04-04 RX ORDER — POTASSIUM CHLORIDE 20 MEQ
40 PACKET (EA) ORAL EVERY 4 HOURS
Refills: 0 | Status: COMPLETED | OUTPATIENT
Start: 2020-04-04 | End: 2020-04-04

## 2020-04-04 RX ORDER — HYDROXYCHLOROQUINE SULFATE 200 MG
1 TABLET ORAL
Qty: 6 | Refills: 0
Start: 2020-04-04 | End: 2020-04-06

## 2020-04-04 RX ORDER — ALBUTEROL 90 UG/1
2 AEROSOL, METERED ORAL
Qty: 1 | Refills: 0
Start: 2020-04-04 | End: 2020-04-13

## 2020-04-04 RX ORDER — FLUCONAZOLE 150 MG/1
150 TABLET ORAL ONCE
Refills: 0 | Status: COMPLETED | OUTPATIENT
Start: 2020-04-04 | End: 2020-04-04

## 2020-04-04 RX ADMIN — Medication 200 MILLIGRAM(S): at 14:54

## 2020-04-04 RX ADMIN — Medication 200 MILLIGRAM(S): at 04:21

## 2020-04-04 RX ADMIN — ENOXAPARIN SODIUM 40 MILLIGRAM(S): 100 INJECTION SUBCUTANEOUS at 12:18

## 2020-04-04 RX ADMIN — Medication 200 MILLIGRAM(S): at 14:57

## 2020-04-04 RX ADMIN — Medication 40 MILLIEQUIVALENT(S): at 14:58

## 2020-04-04 RX ADMIN — Medication 100 MILLIGRAM(S): at 09:36

## 2020-04-04 RX ADMIN — Medication 1: at 09:34

## 2020-04-04 RX ADMIN — Medication 40 MILLIEQUIVALENT(S): at 12:22

## 2020-04-04 RX ADMIN — Medication 200 MILLIGRAM(S): at 04:23

## 2020-04-04 RX ADMIN — FLUCONAZOLE 150 MILLIGRAM(S): 150 TABLET ORAL at 12:21

## 2020-04-04 RX ADMIN — ALBUTEROL 2 PUFF(S): 90 AEROSOL, METERED ORAL at 09:33

## 2020-04-04 RX ADMIN — Medication 1: at 12:18

## 2020-04-04 RX ADMIN — ALBUTEROL 2 PUFF(S): 90 AEROSOL, METERED ORAL at 04:21

## 2020-04-04 NOTE — PROGRESS NOTE ADULT - PROBLEM SELECTOR PLAN 2
- c/w supplemental O2 to maintain SpO2 > 92%, currently on 1L NC  -avoid HFNC and NPPV given suspicion for COVID  - c/w albuterol inhaler standing q6h; pt can refuse if not needed
- c/w supplemental O2 to maintain SpO2 > 92%, currently on 4L NC  -avoid HFNC and NPPV given suspicion for COVID  - c/w albuterol inhaler standing q6h; pt can refuse if not needed
-resolving. 95% on RA, 91% on exertion   -avoid HFNC and NPPV given suspicion for COVID  - c/w albuterol inhaler standing q6h; pt can refuse if not needed
23

## 2020-04-04 NOTE — PROGRESS NOTE ADULT - ASSESSMENT
50 y/o female with h/o non-insulin dependent T2DM admitted for hypoxic respiratory failure 2/2 COVID infection

## 2020-04-04 NOTE — PROGRESS NOTE ADULT - PROBLEM SELECTOR PLAN 6
- DVT ppx: lovenox SQ 40   - Diet: consistent carb

## 2020-04-04 NOTE — DISCHARGE NOTE NURSING/CASE MANAGEMENT/SOCIAL WORK - PATIENT PORTAL LINK FT
You can access the FollowMyHealth Patient Portal offered by [facility] by registering at the following website: [email or web address]. By joining [employer]’s FollowMyHealth portal, you will also be able to view your health information using other applications (apps) compatible with our system.

## 2020-04-04 NOTE — PROGRESS NOTE ADULT - ATTENDING COMMENTS
Spoke to son Ran. Plan to DC home today. time spent 45 mins
Spoke to son Caden. Plan to DC home tomorrow if remains stable on RA, needs insulin teaching

## 2020-04-04 NOTE — PROGRESS NOTE ADULT - SUBJECTIVE AND OBJECTIVE BOX
Patient is a 49y old  Female who presents with a chief complaint of Fevers (02 Apr 2020 06:01)    SUBJECTIVE / OVERNIGHT EVENTS: Feels better. Still with cough however reports shortness of breath improved. i ambulated patient and her sat was 91-92%. Patient appeared not in distress. Also explained to her signs and symptoms of hypoglycemia. explained to her that she should monitor Fs at home. She needs to f.u with pcp for diabetes monitoring and insulin adjustments.      MEDICATIONS  (STANDING):  acetaminophen   Tablet .. 650 milliGRAM(s) Oral once  ALBUTerol    90 MICROgram(s) HFA Inhaler 2 Puff(s) Inhalation every 6 hours  dextrose 5%. 1000 milliLiter(s) (50 mL/Hr) IV Continuous <Continuous>  dextrose 50% Injectable 12.5 Gram(s) IV Push once  dextrose 50% Injectable 25 Gram(s) IV Push once  dextrose 50% Injectable 25 Gram(s) IV Push once  enoxaparin Injectable 40 milliGRAM(s) SubCutaneous daily  insulin lispro (HumaLOG) corrective regimen sliding scale   SubCutaneous three times a day before meals  potassium chloride    Tablet ER 40 milliEquivalent(s) Oral every 4 hours    MEDICATIONS  (PRN):  acetaminophen   Tablet .. 650 milliGRAM(s) Oral every 6 hours PRN Temp greater or equal to 38C (100.4F)  benzonatate 100 milliGRAM(s) Oral three times a day PRN Cough  dextrose 40% Gel 15 Gram(s) Oral once PRN Blood Glucose LESS THAN 70 milliGRAM(s)/deciliter  glucagon  Injectable 1 milliGRAM(s) IntraMuscular once PRN Glucose LESS THAN 70 milligrams/deciliter    Vital Signs Last 24 Hrs  T(C): 37.1 (04 Apr 2020 12:10), Max: 37.2 (03 Apr 2020 20:25)  T(F): 98.7 (04 Apr 2020 12:10), Max: 99 (03 Apr 2020 20:25)  HR: 100 (04 Apr 2020 12:10) (94 - 102)  BP: 158/87 (04 Apr 2020 12:10) (138/94 - 158/87)  BP(mean): --  RR: 20 (04 Apr 2020 12:10) (19 - 22)  SpO2: 93% (04 Apr 2020 12:10) (89% - 96%)    CAPILLARY BLOOD GLUCOSE      POCT Blood Glucose.: 192 mg/dL (04 Apr 2020 12:03)  POCT Blood Glucose.: 170 mg/dL (04 Apr 2020 09:06)  POCT Blood Glucose.: 185 mg/dL (03 Apr 2020 22:26)  POCT Blood Glucose.: 156 mg/dL (03 Apr 2020 17:18)        PHYSICAL EXAM:  GENERAL: NAD, well-developed  HEAD:  Atraumatic, Normocephalic  EYES: EOMI, PERRLA, conjunctiva and sclera clear  NECK: Supple,  CHEST/LUNG: no accessary muscle use  HEART: tachycardic on vitals  ABDOMEN: Soft, Nontender, Nondistended;   EXTREMITIES:   No clubbing, cyanosis, or edema  PSYCH: AAOx3  NEUROLOGY: non-focal  SKIN: No rashes or lesions  GYn - white discharge noted on vulva     LABS:                          12.1   10.52 )-----------( 331      ( 04 Apr 2020 07:50 )             35.0   04-04    135  |  98  |  8   ----------------------------<  190<H>  3.1<L>   |  22  |  0.36<L>    Ca    9.3      04 Apr 2020 09:39  Phos  1.5     04-03  Mg     1.9     04-03        RADIOLOGY & ADDITIONAL TESTS:    Imaging Personally Reviewed: < from: Xray Chest 1 View- PORTABLE-Urgent (04.01.20 @ 13:15) >  Bibasilar opacities may represent covid 19 infection.    < end of copied text >      Consultant(s) Notes Reviewed:      Care Discussed with Consultants/Other Providers:    Assessment and Plan:

## 2020-04-04 NOTE — PROGRESS NOTE ADULT - PROBLEM SELECTOR PLAN 5
-DM2 on metformin at home  -hgbA1c 11.4  -will need insulin on discharge  -c/w lantus to 8 units and metformin at home, signs and symptoms of hypoglycemia discussed with patient. Insulin teaching proved. Patient feels comfortable checking her FS and administering insulin   -will f.u with pcp for diabetes monitoring

## 2020-04-04 NOTE — PROGRESS NOTE ADULT - PROBLEM SELECTOR PLAN 1
-COVID +  -given hypoxia and covid infection, on hydroxychloroquine  - Tylenol PRN for fevers  - Tessalon perle PRN for cough  - monitor respiratory status closely  -c/w NC for 02 supplementation for now. If worsening hypoxia, titrate up.   -no nebs/bipap/highflow  -95% on RA and 91% on ambulation.   -DC home today. recommend self quarantine for 14 days

## 2020-04-04 NOTE — CHART NOTE - NSCHARTNOTEFT_GEN_A_CORE
On 4/4/20, case was discussed with Dr. Russell, patient is medically cleared and optimized for discharge home today.   All medications were reviewed with attending, and sent to [facility]- patient to complete course plaquenil as discussed with ID Dr. Esteves; discharge patient on Lantus 8u qhs and home metformin dose. Meds sent and picked up from [facility] ($10 copay), insulin/glucometer teaching was provided by RN- patient and Son in agreement with plan of care, has glucometer/DM supplies at home.

## 2020-04-14 PROBLEM — E11.9 TYPE 2 DIABETES MELLITUS WITHOUT COMPLICATIONS: Chronic | Status: ACTIVE | Noted: 2020-04-02

## 2020-06-04 PROBLEM — Z00.00 ENCOUNTER FOR PREVENTIVE HEALTH EXAMINATION: Status: ACTIVE | Noted: 2020-06-04

## 2020-06-09 ENCOUNTER — APPOINTMENT (OUTPATIENT)
Dept: ENDOCRINOLOGY | Facility: CLINIC | Age: 50
End: 2020-06-09

## 2021-09-28 NOTE — DISCHARGE NOTE PROVIDER - NSDCMRMEDTOKEN_GEN_ALL_CORE_FT
Pt here from home was seen in ent clinic today post op for splint removal and it was noted his hr was elevated. Pt also states he is sob with activity but this is not worse or new over the last few months.   metFORMIN 500 mg oral tablet: 1 tab(s) orally 2 times a day albuterol 90 mcg/inh inhalation aerosol: 2 puff(s) inhaled every 6 hours, As Needed for shortness of breath/wheezing   benzonatate 100 mg oral capsule: 1 cap(s) orally 3 times a day, As needed, Cough  glucose tablets: Follow instructions on bottle when sugar is low.  guaiFENesin 100 mg/5 mL oral liquid: 10 milliliter(s) orally every 6 hours, As needed, Cough  hydroxychloroquine 200 mg oral tablet: 1 tab(s) orally every 12 hours at 6am/6pm  per infectious disease   Lantus Solostar Pen 100 units/mL subcutaneous solution: 8 unit(s) subcutaneous once a day (at bedtime)   metFORMIN 500 mg oral tablet: 1 tab(s) orally 2 times a day  Tylenol Extra Strength 500 mg oral tablet: 1-2  tab(s) orally every 8 hours, As Needed for fever or pain

## 2021-10-28 ENCOUNTER — RESULT REVIEW (OUTPATIENT)
Age: 51
End: 2021-10-28

## 2022-04-01 ENCOUNTER — APPOINTMENT (OUTPATIENT)
Dept: GASTROENTEROLOGY | Facility: CLINIC | Age: 52
End: 2022-04-01

## 2022-07-29 NOTE — ED ADULT TRIAGE NOTE - TEMPERATURE IN FAHRENHEIT (DEGREES F)
7/29/22 10:12 am  spoke w/ mother aware + COVID andinstructed to quarantine as per CDC guidelines and child  is better instructed to f/u w/ PMD reviewed ED return precautions MPopcun PNP
102.8

## 2023-08-18 ENCOUNTER — INPATIENT (INPATIENT)
Facility: HOSPITAL | Age: 53
LOS: 3 days | Discharge: HOME | End: 2023-08-22
Attending: STUDENT IN AN ORGANIZED HEALTH CARE EDUCATION/TRAINING PROGRAM | Admitting: STUDENT IN AN ORGANIZED HEALTH CARE EDUCATION/TRAINING PROGRAM
Payer: COMMERCIAL

## 2023-08-18 VITALS
RESPIRATION RATE: 16 BRPM | TEMPERATURE: 99 F | DIASTOLIC BLOOD PRESSURE: 93 MMHG | SYSTOLIC BLOOD PRESSURE: 151 MMHG | HEART RATE: 109 BPM | OXYGEN SATURATION: 99 %

## 2023-08-18 PROCEDURE — 99285 EMERGENCY DEPT VISIT HI MDM: CPT

## 2023-08-18 NOTE — ED ADULT TRIAGE NOTE - CHIEF COMPLAINT QUOTE
Pt c/o pelvic pain radiating to buttock since Sunday, discomfort when using the bathroom. Denies diarrhea, constipation, nausea, vomiting, fever or chills. PHMx s/p colon surgery 2 yrs ago, DM ,

## 2023-08-19 DIAGNOSIS — R10.2 PELVIC AND PERINEAL PAIN: ICD-10-CM

## 2023-08-19 LAB
ALBUMIN SERPL ELPH-MCNC: 4.3 G/DL — SIGNIFICANT CHANGE UP (ref 3.3–5)
ALP SERPL-CCNC: 116 U/L — SIGNIFICANT CHANGE UP (ref 40–120)
ALT FLD-CCNC: 44 U/L — HIGH (ref 4–33)
ANION GAP SERPL CALC-SCNC: 12 MMOL/L — SIGNIFICANT CHANGE UP (ref 7–14)
APPEARANCE UR: CLEAR — SIGNIFICANT CHANGE UP
AST SERPL-CCNC: 26 U/L — SIGNIFICANT CHANGE UP (ref 4–32)
BACTERIA # UR AUTO: NEGATIVE /HPF — SIGNIFICANT CHANGE UP
BASE EXCESS BLDV CALC-SCNC: -0.8 MMOL/L — SIGNIFICANT CHANGE UP (ref -2–3)
BASOPHILS # BLD AUTO: 0.04 K/UL — SIGNIFICANT CHANGE UP (ref 0–0.2)
BASOPHILS NFR BLD AUTO: 0.4 % — SIGNIFICANT CHANGE UP (ref 0–2)
BILIRUB SERPL-MCNC: 0.7 MG/DL — SIGNIFICANT CHANGE UP (ref 0.2–1.2)
BILIRUB UR-MCNC: NEGATIVE — SIGNIFICANT CHANGE UP
BLOOD GAS VENOUS COMPREHENSIVE RESULT: SIGNIFICANT CHANGE UP
BUN SERPL-MCNC: 10 MG/DL — SIGNIFICANT CHANGE UP (ref 7–23)
CALCIUM SERPL-MCNC: 9.7 MG/DL — SIGNIFICANT CHANGE UP (ref 8.4–10.5)
CAST: 0 /LPF — SIGNIFICANT CHANGE UP (ref 0–4)
CHLORIDE BLDV-SCNC: 102 MMOL/L — SIGNIFICANT CHANGE UP (ref 96–108)
CHLORIDE SERPL-SCNC: 98 MMOL/L — SIGNIFICANT CHANGE UP (ref 98–107)
CO2 BLDV-SCNC: 26.3 MMOL/L — HIGH (ref 22–26)
CO2 SERPL-SCNC: 25 MMOL/L — SIGNIFICANT CHANGE UP (ref 22–31)
COLOR SPEC: YELLOW — SIGNIFICANT CHANGE UP
CREAT SERPL-MCNC: 0.33 MG/DL — LOW (ref 0.5–1.3)
DIFF PNL FLD: NEGATIVE — SIGNIFICANT CHANGE UP
EGFR: 124 ML/MIN/1.73M2 — SIGNIFICANT CHANGE UP
EOSINOPHIL # BLD AUTO: 0.08 K/UL — SIGNIFICANT CHANGE UP (ref 0–0.5)
EOSINOPHIL NFR BLD AUTO: 0.8 % — SIGNIFICANT CHANGE UP (ref 0–6)
GAS PNL BLDV: 136 MMOL/L — SIGNIFICANT CHANGE UP (ref 136–145)
GAS PNL BLDV: SIGNIFICANT CHANGE UP
GLUCOSE BLDC GLUCOMTR-MCNC: 239 MG/DL — HIGH (ref 70–99)
GLUCOSE BLDV-MCNC: 272 MG/DL — HIGH (ref 70–99)
GLUCOSE SERPL-MCNC: 298 MG/DL — HIGH (ref 70–99)
GLUCOSE UR QL: >=1000 MG/DL
HCO3 BLDV-SCNC: 25 MMOL/L — SIGNIFICANT CHANGE UP (ref 22–29)
HCT VFR BLD CALC: 40.4 % — SIGNIFICANT CHANGE UP (ref 34.5–45)
HCT VFR BLDA CALC: 37 % — SIGNIFICANT CHANGE UP (ref 34.5–46.5)
HGB BLD CALC-MCNC: 12.4 G/DL — SIGNIFICANT CHANGE UP (ref 11.7–16.1)
HGB BLD-MCNC: 13.7 G/DL — SIGNIFICANT CHANGE UP (ref 11.5–15.5)
IANC: 6.95 K/UL — SIGNIFICANT CHANGE UP (ref 1.8–7.4)
IMM GRANULOCYTES NFR BLD AUTO: 0.4 % — SIGNIFICANT CHANGE UP (ref 0–0.9)
KETONES UR-MCNC: 15 MG/DL
LACTATE BLDV-MCNC: 1.7 MMOL/L — SIGNIFICANT CHANGE UP (ref 0.5–2)
LEUKOCYTE ESTERASE UR-ACNC: NEGATIVE — SIGNIFICANT CHANGE UP
LYMPHOCYTES # BLD AUTO: 2.58 K/UL — SIGNIFICANT CHANGE UP (ref 1–3.3)
LYMPHOCYTES # BLD AUTO: 24.3 % — SIGNIFICANT CHANGE UP (ref 13–44)
MCHC RBC-ENTMCNC: 29.5 PG — SIGNIFICANT CHANGE UP (ref 27–34)
MCHC RBC-ENTMCNC: 33.9 GM/DL — SIGNIFICANT CHANGE UP (ref 32–36)
MCV RBC AUTO: 87.1 FL — SIGNIFICANT CHANGE UP (ref 80–100)
MONOCYTES # BLD AUTO: 0.91 K/UL — HIGH (ref 0–0.9)
MONOCYTES NFR BLD AUTO: 8.6 % — SIGNIFICANT CHANGE UP (ref 2–14)
NEUTROPHILS # BLD AUTO: 6.95 K/UL — SIGNIFICANT CHANGE UP (ref 1.8–7.4)
NEUTROPHILS NFR BLD AUTO: 65.5 % — SIGNIFICANT CHANGE UP (ref 43–77)
NITRITE UR-MCNC: NEGATIVE — SIGNIFICANT CHANGE UP
NRBC # BLD: 0 /100 WBCS — SIGNIFICANT CHANGE UP (ref 0–0)
NRBC # FLD: 0 K/UL — SIGNIFICANT CHANGE UP (ref 0–0)
PCO2 BLDV: 44 MMHG — SIGNIFICANT CHANGE UP (ref 39–52)
PH BLDV: 7.36 — SIGNIFICANT CHANGE UP (ref 7.32–7.43)
PH UR: 5.5 — SIGNIFICANT CHANGE UP (ref 5–8)
PLATELET # BLD AUTO: 209 K/UL — SIGNIFICANT CHANGE UP (ref 150–400)
PO2 BLDV: 57 MMHG — HIGH (ref 25–45)
POTASSIUM BLDV-SCNC: 3.6 MMOL/L — SIGNIFICANT CHANGE UP (ref 3.5–5.1)
POTASSIUM SERPL-MCNC: 3.9 MMOL/L — SIGNIFICANT CHANGE UP (ref 3.5–5.3)
POTASSIUM SERPL-SCNC: 3.9 MMOL/L — SIGNIFICANT CHANGE UP (ref 3.5–5.3)
PROT SERPL-MCNC: 7.4 G/DL — SIGNIFICANT CHANGE UP (ref 6–8.3)
PROT UR-MCNC: NEGATIVE MG/DL — SIGNIFICANT CHANGE UP
RBC # BLD: 4.64 M/UL — SIGNIFICANT CHANGE UP (ref 3.8–5.2)
RBC # FLD: 12.3 % — SIGNIFICANT CHANGE UP (ref 10.3–14.5)
RBC CASTS # UR COMP ASSIST: 0 /HPF — SIGNIFICANT CHANGE UP (ref 0–4)
SAO2 % BLDV: 86.6 % — SIGNIFICANT CHANGE UP (ref 67–88)
SODIUM SERPL-SCNC: 135 MMOL/L — SIGNIFICANT CHANGE UP (ref 135–145)
SP GR SPEC: 1.05 — HIGH (ref 1–1.03)
SQUAMOUS # UR AUTO: 1 /HPF — SIGNIFICANT CHANGE UP (ref 0–5)
UROBILINOGEN FLD QL: 0.2 MG/DL — SIGNIFICANT CHANGE UP (ref 0.2–1)
WBC # BLD: 10.6 K/UL — HIGH (ref 3.8–10.5)
WBC # FLD AUTO: 10.6 K/UL — HIGH (ref 3.8–10.5)
WBC UR QL: 6 /HPF — HIGH (ref 0–5)

## 2023-08-19 PROCEDURE — 99221 1ST HOSP IP/OBS SF/LOW 40: CPT

## 2023-08-19 PROCEDURE — 76830 TRANSVAGINAL US NON-OB: CPT | Mod: 26

## 2023-08-19 PROCEDURE — 74177 CT ABD & PELVIS W/CONTRAST: CPT | Mod: 26,MA

## 2023-08-19 PROCEDURE — 99223 1ST HOSP IP/OBS HIGH 75: CPT

## 2023-08-19 RX ORDER — PIPERACILLIN AND TAZOBACTAM 4; .5 G/20ML; G/20ML
3.38 INJECTION, POWDER, LYOPHILIZED, FOR SOLUTION INTRAVENOUS ONCE
Refills: 0 | Status: COMPLETED | OUTPATIENT
Start: 2023-08-20 | End: 2023-08-20

## 2023-08-19 RX ORDER — DEXTROSE 50 % IN WATER 50 %
15 SYRINGE (ML) INTRAVENOUS ONCE
Refills: 0 | Status: DISCONTINUED | OUTPATIENT
Start: 2023-08-19 | End: 2023-08-22

## 2023-08-19 RX ORDER — ACETAMINOPHEN 500 MG
1000 TABLET ORAL ONCE
Refills: 0 | Status: COMPLETED | OUTPATIENT
Start: 2023-08-19 | End: 2023-08-19

## 2023-08-19 RX ORDER — ONDANSETRON 8 MG/1
4 TABLET, FILM COATED ORAL EVERY 8 HOURS
Refills: 0 | Status: DISCONTINUED | OUTPATIENT
Start: 2023-08-19 | End: 2023-08-22

## 2023-08-19 RX ORDER — DEXTROSE 50 % IN WATER 50 %
25 SYRINGE (ML) INTRAVENOUS ONCE
Refills: 0 | Status: DISCONTINUED | OUTPATIENT
Start: 2023-08-19 | End: 2023-08-22

## 2023-08-19 RX ORDER — ALBENDAZOLE 200 MG/1
400 TABLET, FILM COATED ORAL ONCE
Refills: 0 | Status: COMPLETED | OUTPATIENT
Start: 2023-08-19 | End: 2023-08-19

## 2023-08-19 RX ORDER — DEXTROSE 50 % IN WATER 50 %
12.5 SYRINGE (ML) INTRAVENOUS ONCE
Refills: 0 | Status: DISCONTINUED | OUTPATIENT
Start: 2023-08-19 | End: 2023-08-22

## 2023-08-19 RX ORDER — MORPHINE SULFATE 50 MG/1
4 CAPSULE, EXTENDED RELEASE ORAL EVERY 6 HOURS
Refills: 0 | Status: DISCONTINUED | OUTPATIENT
Start: 2023-08-19 | End: 2023-08-22

## 2023-08-19 RX ORDER — PHENAZOPYRIDINE HCL 100 MG
1 TABLET ORAL
Qty: 6 | Refills: 0
Start: 2023-08-19 | End: 2023-08-20

## 2023-08-19 RX ORDER — SODIUM CHLORIDE 9 MG/ML
1000 INJECTION, SOLUTION INTRAVENOUS
Refills: 0 | Status: DISCONTINUED | OUTPATIENT
Start: 2023-08-19 | End: 2023-08-22

## 2023-08-19 RX ORDER — PHENAZOPYRIDINE HCL 100 MG
200 TABLET ORAL ONCE
Refills: 0 | Status: COMPLETED | OUTPATIENT
Start: 2023-08-19 | End: 2023-08-19

## 2023-08-19 RX ORDER — ACETAMINOPHEN 500 MG
650 TABLET ORAL ONCE
Refills: 0 | Status: COMPLETED | OUTPATIENT
Start: 2023-08-19 | End: 2023-08-19

## 2023-08-19 RX ORDER — ALBENDAZOLE 200 MG/1
2 TABLET, FILM COATED ORAL
Qty: 2 | Refills: 0
Start: 2023-08-19 | End: 2023-08-19

## 2023-08-19 RX ORDER — INSULIN LISPRO 100/ML
2 VIAL (ML) SUBCUTANEOUS ONCE
Refills: 0 | Status: COMPLETED | OUTPATIENT
Start: 2023-08-19 | End: 2023-08-19

## 2023-08-19 RX ORDER — SODIUM CHLORIDE 9 MG/ML
1000 INJECTION INTRAMUSCULAR; INTRAVENOUS; SUBCUTANEOUS
Refills: 0 | Status: DISCONTINUED | OUTPATIENT
Start: 2023-08-19 | End: 2023-08-22

## 2023-08-19 RX ORDER — INSULIN LISPRO 100/ML
VIAL (ML) SUBCUTANEOUS AT BEDTIME
Refills: 0 | Status: DISCONTINUED | OUTPATIENT
Start: 2023-08-19 | End: 2023-08-20

## 2023-08-19 RX ORDER — ACETAMINOPHEN 500 MG
650 TABLET ORAL EVERY 6 HOURS
Refills: 0 | Status: DISCONTINUED | OUTPATIENT
Start: 2023-08-19 | End: 2023-08-19

## 2023-08-19 RX ORDER — LANOLIN ALCOHOL/MO/W.PET/CERES
3 CREAM (GRAM) TOPICAL AT BEDTIME
Refills: 0 | Status: DISCONTINUED | OUTPATIENT
Start: 2023-08-19 | End: 2023-08-22

## 2023-08-19 RX ORDER — ACETAMINOPHEN 500 MG
650 TABLET ORAL EVERY 8 HOURS
Refills: 0 | Status: COMPLETED | OUTPATIENT
Start: 2023-08-19 | End: 2023-08-21

## 2023-08-19 RX ORDER — METRONIDAZOLE 7.5 MG/G
1 GEL VAGINAL
Qty: 1 | Refills: 0
Start: 2023-08-19 | End: 2023-08-23

## 2023-08-19 RX ORDER — GLUCAGON INJECTION, SOLUTION 0.5 MG/.1ML
1 INJECTION, SOLUTION SUBCUTANEOUS ONCE
Refills: 0 | Status: DISCONTINUED | OUTPATIENT
Start: 2023-08-19 | End: 2023-08-22

## 2023-08-19 RX ORDER — PIPERACILLIN AND TAZOBACTAM 4; .5 G/20ML; G/20ML
3.38 INJECTION, POWDER, LYOPHILIZED, FOR SOLUTION INTRAVENOUS ONCE
Refills: 0 | Status: COMPLETED | OUTPATIENT
Start: 2023-08-19 | End: 2023-08-19

## 2023-08-19 RX ORDER — MORPHINE SULFATE 50 MG/1
2 CAPSULE, EXTENDED RELEASE ORAL ONCE
Refills: 0 | Status: DISCONTINUED | OUTPATIENT
Start: 2023-08-19 | End: 2023-08-19

## 2023-08-19 RX ORDER — SODIUM CHLORIDE 9 MG/ML
1000 INJECTION INTRAMUSCULAR; INTRAVENOUS; SUBCUTANEOUS ONCE
Refills: 0 | Status: COMPLETED | OUTPATIENT
Start: 2023-08-19 | End: 2023-08-19

## 2023-08-19 RX ORDER — MORPHINE SULFATE 50 MG/1
2 CAPSULE, EXTENDED RELEASE ORAL EVERY 4 HOURS
Refills: 0 | Status: DISCONTINUED | OUTPATIENT
Start: 2023-08-19 | End: 2023-08-22

## 2023-08-19 RX ORDER — KETOROLAC TROMETHAMINE 30 MG/ML
15 SYRINGE (ML) INJECTION ONCE
Refills: 0 | Status: DISCONTINUED | OUTPATIENT
Start: 2023-08-19 | End: 2023-08-19

## 2023-08-19 RX ORDER — METRONIDAZOLE 7.5 MG/G
1 GEL VAGINAL ONCE
Refills: 0 | Status: COMPLETED | OUTPATIENT
Start: 2023-08-19 | End: 2023-08-19

## 2023-08-19 RX ORDER — INSULIN LISPRO 100/ML
VIAL (ML) SUBCUTANEOUS
Refills: 0 | Status: DISCONTINUED | OUTPATIENT
Start: 2023-08-19 | End: 2023-08-20

## 2023-08-19 RX ORDER — MORPHINE SULFATE 50 MG/1
4 CAPSULE, EXTENDED RELEASE ORAL ONCE
Refills: 0 | Status: DISCONTINUED | OUTPATIENT
Start: 2023-08-19 | End: 2023-08-19

## 2023-08-19 RX ORDER — METFORMIN HYDROCHLORIDE 850 MG/1
1 TABLET ORAL
Qty: 0 | Refills: 0 | DISCHARGE

## 2023-08-19 RX ORDER — PIPERACILLIN AND TAZOBACTAM 4; .5 G/20ML; G/20ML
3.38 INJECTION, POWDER, LYOPHILIZED, FOR SOLUTION INTRAVENOUS EVERY 8 HOURS
Refills: 0 | Status: DISCONTINUED | OUTPATIENT
Start: 2023-08-20 | End: 2023-08-22

## 2023-08-19 RX ADMIN — Medication 650 MILLIGRAM(S): at 23:33

## 2023-08-19 RX ADMIN — Medication 15 MILLIGRAM(S): at 04:59

## 2023-08-19 RX ADMIN — Medication 200 MILLIGRAM(S): at 13:55

## 2023-08-19 RX ADMIN — METRONIDAZOLE 1 APPLICATORFUL: 7.5 GEL VAGINAL at 08:54

## 2023-08-19 RX ADMIN — Medication 15 MILLIGRAM(S): at 04:27

## 2023-08-19 RX ADMIN — MORPHINE SULFATE 2 MILLIGRAM(S): 50 CAPSULE, EXTENDED RELEASE ORAL at 19:53

## 2023-08-19 RX ADMIN — ALBENDAZOLE 400 MILLIGRAM(S): 200 TABLET, FILM COATED ORAL at 08:52

## 2023-08-19 RX ADMIN — SODIUM CHLORIDE 1000 MILLILITER(S): 9 INJECTION INTRAMUSCULAR; INTRAVENOUS; SUBCUTANEOUS at 04:27

## 2023-08-19 RX ADMIN — Medication 400 MILLIGRAM(S): at 08:38

## 2023-08-19 RX ADMIN — MORPHINE SULFATE 4 MILLIGRAM(S): 50 CAPSULE, EXTENDED RELEASE ORAL at 22:10

## 2023-08-19 RX ADMIN — Medication 650 MILLIGRAM(S): at 13:54

## 2023-08-19 RX ADMIN — Medication 1000 MILLIGRAM(S): at 10:29

## 2023-08-19 RX ADMIN — Medication 15 MILLIGRAM(S): at 08:39

## 2023-08-19 RX ADMIN — Medication 15 MILLIGRAM(S): at 10:36

## 2023-08-19 NOTE — CONSULT NOTE ADULT - ATTENDING COMMENTS
Late entry - Patient seen and examined with resident on date and time of consult done with resident=   Attending note     54yo  postmenopausal ( States menses stopped over a year ago) w/ PMHx of fibroid uterus and T2DM. She presents with  lower abdominal pain and rectal pain for 1 week. On my evaluation there is no rebound or guarding and pain is more on the lower pelvis to the left and reports rectal pressure. Findings of degenerating fibroid and appearance of colon surgery ( she reports resection of mass from colon) its possible that pain is related to degenerating fibroid but best to obtain surgical or colorectal surgical evaluation to evaluate the patient as well.   At this time, degeneration fibroid will only need GYN intervention would be pain management with NSAIDS ,If no medical contraindications, as work best for inflammatory process caused by degeneration. Recommend gyn outpatient followup. If no gyn- followup at [facility] clinic

## 2023-08-19 NOTE — H&P ADULT - ADDITIONAL PE
Exam per Gyn Team:  No bleeding on pad. External labia wnl. Bimanual exam with no cervical motion tenderness, uterus wnl, adnexa non palpable b/l.  Cervix closed vs. Cervix dilated *** cm Speculum Exam: No active bleeding from os. Physiologic discharge.

## 2023-08-19 NOTE — ED ADULT NURSE REASSESSMENT NOTE - SYMPTOMS
As per report Pt with c/o pelvic radiating into buttock since Sunday. Vs as per flow sheet charting. Pt awaits further eval . positoned for comfort./pain:
abdominal pain

## 2023-08-19 NOTE — ED PROVIDER NOTE - NSFOLLOWUPCLINICS_GEN_ALL_ED_FT
[facility]  OB/GYN & Surg  [phone] [place] [address]  Phone: [phone]  Fax:

## 2023-08-19 NOTE — H&P ADULT - HISTORY OF PRESENT ILLNESS
54 y/o Female with MHx of DM Type 2, fibroids presents to ED c/o non-radiating lower abdominal pain and rectal pain x 1 week. Also endorsing painful urination. Pt reports f/u with  OBGYN outpt but last pelvic exam was "several years ago". States that had a TVUS within the past 12 months to evaluate the size of her fibroids. She reports not taking anything for the pain control PTA. Reports also no fevers, chest pain, difficulty breathing, nausea, vomiting, diarrhea, blood in the stool, hematuria or back pain. 54 y/o Female with MHx of DM Type 2, fibroids, h/o colon mass excision in 2021 (reportedly benign , no chemotherapy was dministered) c/o severe non-radiating lower abdominal pain and rectal pain x 1 week. Also endorsing painful urination. Pt reports f/u with  OBGYN outpt but last pelvic exam was "several years ago". States that had a TVUS within the past 12 months to evaluate the size of her fibroids. She reports not taking anything for the pain control PTA. Reports also no fevers, chest pain, difficulty breathing, nausea, vomiting, diarrhea, blood in the stool, hematuria or back pain.

## 2023-08-19 NOTE — ED PROVIDER NOTE - PHYSICAL EXAMINATION
Gen: well appearing, in no acute distress   Head: normal appearing  HEENT: normal conjunctiva, oral mucosa moist, vision grossly intact   Lung: no respiratory distress, speaking in full sentences, CTA b/l, no wheeze, crackles or rhonchi   CV: regular rate and rhythm, no murmurs  Abd: soft, non-distended, suprapubic tenderness on exam   MSK: no visible deformities, ambulating without difficulty   : external genitalia appears normal, speculum exam remarkable for frothy white discharge, no bleeding noted, cervix appears normal, no cervical motion tenderness, rectum with small white punctate spots   Neuro: No focal deficits, AAOx3  Skin: Warm, no rashes   Psych: normal affect Gen: well appearing, in no acute distress   Head: normal appearing  HEENT: normal conjunctiva, oral mucosa moist, vision grossly intact   Lung: no respiratory distress, speaking in full sentences, CTA b/l, no wheeze, crackles or rhonchi   CV: regular rate and rhythm, no murmurs  Abd: soft, non-distended, suprapubic tenderness on exam   MSK: no visible deformities, ambulating without difficulty   : external genitalia appears normal, speculum exam remarkable for frothy white discharge, no bleeding noted, cervix appears normal, no cervical motion tenderness, rectum with small white punctate spots   Chaperone: Dr See   Neuro: No focal deficits, AAOx3  Skin: Warm, no rashes   Psych: normal affect

## 2023-08-19 NOTE — H&P ADULT - ASSESSMENT
54 y/o Female with MHx of DM Type 2, fibroids, h/o colon mass excision in 2021  a/w sepsis of likely GI source i/s/o severe lower Abd pain of multifactorial etiology; Evaluated by Gen Sx and Gyn;

## 2023-08-19 NOTE — CONSULT NOTE ADULT - SUBJECTIVE AND OBJECTIVE BOX
VISHAL AMANDA  53y  Female [id number]    HPI:      Name of GYN Physician:     ObHx:  GynHx: Denies fibroids, cysts, endometriosis, STI's, Abnormal pap smears   PMHx:  SurgHx:  Meds:  Allergies:  Social History:  Denies smoking use, drug use, alcohol use.    Vital Signs Last 24 Hrs  T(C): 37.2 (19 Aug 2023 20:17), Max: 37.6 (19 Aug 2023 04:32)  T(F): 98.9 (19 Aug 2023 20:17), Max: 99.7 (19 Aug 2023 04:32)  HR: 90 (19 Aug 2023 20:17) (72 - 109)  BP: 151/88 (19 Aug 2023 20:17) (131/89 - 155/93)  RR: 17 (19 Aug 2023 20:17) (16 - 18)  SpO2: 97% (19 Aug 2023 20:17) (97% - 100%)    Parameters below as of 19 Aug 2023 20:17  Patient On (Oxygen Delivery Method): room air        Physical Exam:   General: sitting comfortably in bed, NAD   HEENT: neck supple, full ROM  CV: clinically well perfused  Lungs: unlabored respirations  Back: No CVA tenderness  Abd: Soft, non-tender, non-distended.  Bowel sounds present.    :  No bleeding on pad. External labia wnl. Bimanual exam with no cervical motion tenderness, uterus wnl, adnexa non palpable b/l.  Cervix closed vs. Cervix dilated *** cm   Speculum Exam: No active bleeding from os. Physiologic discharge.    Ext: non-tender b/l, no edema       LABS:                        13.7   10.60 )-----------( 209      ( 19 Aug 2023 03:00 )             40.4     08-19    135  |  98  |  10  ----------------------------<  298<H>  3.9   |  25  |  0.33<L>    Ca    9.7      19 Aug 2023 03:00    TPro  7.4  /  Alb  4.3  /  TBili  0.7  /  DBili  x   /  AST  26  /  ALT  44<H>  /  AlkPhos  116  08-19        Urinalysis Basic - ( 19 Aug 2023 03:00 )    Color: x / Appearance: x / SG: x / pH: x  Gluc: 298 mg/dL / Ketone: x  / Bili: x / Urobili: x   Blood: x / Protein: x / Nitrite: x   Leuk Esterase: x / RBC: x / WBC x   Sq Epi: x / Non Sq Epi: x / Bacteria: x        RADIOLOGY & ADDITIONAL STUDIES:  < from: US Transvaginal (08.19.23 @ 18:44) >  ACC: [id number] EXAM:  US TRANSVAGINAL   ORDERED BY: AILYN MONTOYA     PROCEDURE DATE:  08/19/2023          INTERPRETATION:  CLINICAL INFORMATION: 53-year-old female with suprapubic   pain    LMP: 2022    COMPARISON: CT of the same date    TECHNIQUE:  Endovaginal pelvic sonogram only.    FINDINGS:  Uterus: 10.1 x 6.4 x 7.5 cm. Enlarged. 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 IUD again noted.    Right ovary not visualized.  Tubular cystic appearing structure region left adnexa which may present   hydrosalpinx (also suggested on CT series 2 images )    Fluid: None.    IMPRESSION:  Enlarged uterus with lipoleiomyoma.  Questionable left hydrosalpinx.        --- End of Report ---            THO GARCIA MD; Attending Radiologist  This document has been electronically signed. Aug 19 2023  7:00PM    < end of copied text >  < from: CT Abdomen and Pelvis w/ IV Cont (08.19.23 @ 09:24) >  ACC: [id number] EXAM:  CT ABDOMEN AND PELVIS IC   ORDERED BY: BRUCE CONDE     *** ADDENDUM # 1 ***    Typo in the reproduction section. Punctate nonspecific enhancement right   labia    --- End of Report ---    *** END OF ADDENDUM # 1 ***      PROCEDURE DATE:  08/19/2023          INTERPRETATION:  CLINICAL INFORMATION: Non-radiating lower abdominal pain   and rectal pain x 1 week    COMPARISON: None.    CONTRAST/COMPLICATIONS:  IV Contrast: Omnipaque 350  95 cc administered   5 cc discarded  Oral Contrast: NONE  Complications: None reported at time of study completion    PROCEDURE:  CT of the Abdomen and Pelvis was performed.  Sagittal and coronal reformats were performed.    FINDINGS:  LOWER CHEST: Bibasilar atelectasis.    LIVER: Hepatomegaly and steatosis.  BILE DUCTS: Normal caliber.  GALLBLADDER: Within normal limits.  SPLEEN: Within normal limits.  PANCREAS: Within normal limits.  ADRENALS: Within normal limits.  KIDNEYS/URETERS: Within normal limits. No hydronephrosis.    BLADDER: Within normal limits.  REPRODUCTIVE ORGANS: IUD in place. Fibroid uterus. Right myometrial 4.8 x   5.7 x 3.8 cm low attenuating abnormality likely degenerating myoma. The   adnexa are not clearly delineated  Nonspecific involving the right labia  BOWEL: No bowel obstruction. Sigmoid anastomosis is a patulous appearance   suggesting a J-pouch which be correlated with surgical history. Appendix   is normal.  PERITONEUM: No ascites.  VESSELS: Circumferential left renal vein  . RETROPERITONEUM/LYMPH NODES: No lymphadenopathy.  ABDOMINAL WALL: Small umbilical hernia..  BONES: Within normal limits.    IMPRESSION:  Low attenuating right intramural abnormality suggesting a degenerating   myoma measuring 4.8 x 5.7 x 3.8 cm in size.    Sigmoid anastomosis identified with evidence suggestive of possible   J-pouch should be correlated with patient's surgical history given the   somewhat atypical anastomotic features        --- End of Report ---    ***Please see the addendum at the top of thisreport. It may contain   additional important information or changes.****      TERRI MILLS MD; Resident Radiologist  This document has been electronically signed.  FUNMI VIERA MD; Attending Radiologist  This document has been electronically signed. Aug 19 2023 12:54PM  1st Addendum: FUNMI VIERA MD; Attending Radiologist  The first addendum was electronically signed on: Aug 19 2023  4:15PM.    < end of copied text >   VISHAL AMANDA  53y  Female [id number]    HPI: 54yo  (LMP ~1.5 years ago) w/ PMHx of fibroid uterus and T2DM presenting with lower abdominal pain and rectal pain for 1 week. States pain is midline and the left side of her abdomen. Also reports painful pressure with bowel movements. States pain was 10/10 at home, and is currently 3/10 after receiving morphine. Reports subjective chills and dysuria, but denies fevers, n/v, CP, SOB, palpitations, changes in bowel habits, vaginal bleeding or discharge. Patient is being admitted to Medicine for pain management, and GYN was consulted for abdominal pain i/s/o fibroid uterus.      Name of GYN Physician: States she hasn't seen GYN in many years    ObHx:  - FT  x3, TOP x3 w/ D&C  GynHx: +fibroid uterus. Paragard IUD in place for ~26 years. Denies cysts, endometriosis, STI's, Abnormal pap smears   PMHx: DM2  SurgHx: partial colonic resection for tumor - patient is unsure if benign or malignant ()   Meds: metformin 500mg qd  Allergies: NKA  Social History:  Denies smoking use, drug use, alcohol use.    Vital Signs Last 24 Hrs  T(C): 37.2 (19 Aug 2023 20:17), Max: 37.6 (19 Aug 2023 04:32)  T(F): 98.9 (19 Aug 2023 20:17), Max: 99.7 (19 Aug 2023 04:32)  HR: 90 (19 Aug 2023 20:17) (72 - 109)  BP: 151/88 (19 Aug 2023 20:17) (131/89 - 155/93)  RR: 17 (19 Aug 2023 20:17) (16 - 18)  SpO2: 97% (19 Aug 2023 20:17) (97% - 100%)    Parameters below as of 19 Aug 2023 20:17  Patient On (Oxygen Delivery Method): room air        Physical Exam:   General: sitting comfortably in bed, NAD, eating  HEENT: neck supple, full ROM  CV: clinically well perfused  Lungs: unlabored respirations  Back: No CVA tenderness  Abd: Soft, non-tender, non-distended.  Bowel sounds present.  No rebound or guarding  :  No bleeding on pad. External labia wnl. Bimanual exam with no cervical motion tenderness, fibroid uterus ~10w size, adnexa non palpable b/l.  Cervix closed.  Ext: non-tender b/l, no edema       LABS:                        13.7   10.60 )-----------( 209      ( 19 Aug 2023 03:00 )             40.4         135  |  98  |  10  ----------------------------<  298<H>  3.9   |  25  |  0.33<L>    Ca    9.7      19 Aug 2023 03:00    TPro  7.4  /  Alb  4.3  /  TBili  0.7  /  DBili  x   /  AST  26  /  ALT  44<H>  /  AlkPhos  116          Urinalysis Basic - ( 19 Aug 2023 03:00 )    Color: x / Appearance: x / SG: x / pH: x  Gluc: 298 mg/dL / Ketone: x  / Bili: x / Urobili: x   Blood: x / Protein: x / Nitrite: x   Leuk Esterase: x / RBC: x / WBC x   Sq Epi: x / Non Sq Epi: x / Bacteria: x        RADIOLOGY & ADDITIONAL STUDIES:  < from: US Transvaginal (23 @ 18:44) >  ACC: [id number] EXAM:  US TRANSVAGINAL   ORDERED BY: AILYN MONTOYA     PROCEDURE DATE:  2023          INTERPRETATION:  CLINICAL INFORMATION: 53-year-old female with suprapubic   pain    LMP:     COMPARISON: CT of the same date    TECHNIQUE:  Endovaginal pelvic sonogram only.    FINDINGS:  Uterus: 10.1 x 6.4 x 7.5 cm. Enlarged. 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 IUD again noted.    Right ovary not visualized.  Tubular cystic appearing structure region left adnexa which may present   hydrosalpinx (also suggested on CT series 2 images )    Fluid: None.    IMPRESSION:  Enlarged uterus with lipoleiomyoma.  Questionable left hydrosalpinx.        --- End of Report ---            THO GARCIA MD; Attending Radiologist  This document has been electronically signed. Aug 19 2023  7:00PM    < end of copied text >  < from: CT Abdomen and Pelvis w/ IV Cont (23 @ 09:24) >  ACC: [id number] EXAM:  CT ABDOMEN AND PELVIS IC   ORDERED BY: BRUCE CONDE     *** ADDENDUM # 1 ***    Typo in the reproduction section. Punctate nonspecific enhancement right   labia    --- End of Report ---    *** END OF ADDENDUM # 1 ***      PROCEDURE DATE:  2023          INTERPRETATION:  CLINICAL INFORMATION: Non-radiating lower abdominal pain   and rectal pain x 1 week    COMPARISON: None.    CONTRAST/COMPLICATIONS:  IV Contrast: Omnipaque 350  95 cc administered   5 cc discarded  Oral Contrast: NONE  Complications: None reported at time of study completion    PROCEDURE:  CT of the Abdomen and Pelvis was performed.  Sagittal and coronal reformats were performed.    FINDINGS:  LOWER CHEST: Bibasilar atelectasis.    LIVER: Hepatomegaly and steatosis.  BILE DUCTS: Normal caliber.  GALLBLADDER: Within normal limits.  SPLEEN: Within normal limits.  PANCREAS: Within normal limits.  ADRENALS: Within normal limits.  KIDNEYS/URETERS: Within normal limits. No hydronephrosis.    BLADDER: Within normal limits.  REPRODUCTIVE ORGANS: IUD in place. Fibroid uterus. Right myometrial 4.8 x   5.7 x 3.8 cm low attenuating abnormality likely degenerating myoma. The   adnexa are not clearly delineated  Nonspecific involving the right labia  BOWEL: No bowel obstruction. Sigmoid anastomosis is a patulous appearance   suggesting a J-pouch which be correlated with surgical history. Appendix   is normal.  PERITONEUM: No ascites.  VESSELS: Circumferential left renal vein  . RETROPERITONEUM/LYMPH NODES: No lymphadenopathy.  ABDOMINAL WALL: Small umbilical hernia..  BONES: Within normal limits.    IMPRESSION:  Low attenuating right intramural abnormality suggesting a degenerating   myoma measuring 4.8 x 5.7 x 3.8 cm in size.    Sigmoid anastomosis identified with evidence suggestive of possible   J-pouch should be correlated with patient's surgical history given the   somewhat atypical anastomotic features        --- End of Report ---    ***Please see the addendum at the top of thisreport. It may contain   additional important information or changes.****      TERRI MILLS MD; Resident Radiologist  This document has been electronically signed.  FUNMI VIERA MD; Attending Radiologist  This document has been electronically signed. Aug 19 2023 12:54PM  1st Addendum: FUNMI VIERA MD; Attending Radiologist  The first addendum was electronically signed on: Aug 19 2023  4:15PM.    < end of copied text >

## 2023-08-19 NOTE — ED PROVIDER NOTE - CLINICAL SUMMARY MEDICAL DECISION MAKING FREE TEXT BOX
54 y/o F with PMHx of DM and fibroids to ED c/o abdominal pain and rectal pain x 1 week. Tenderness localized to suprapubic region. Associated painful urination. No fevers, n/v/d, vaginal or rectal bleeding. Plan to check labs, urine and Toradol for pain. Patient is afebrile orally and rectally. Will perform  exam. If urine negative, lower threshold for getting CT scan of the A/P. Low clinical suspicion for ovarian torsion, pain is not intermittent, no lateralizing symptoms

## 2023-08-19 NOTE — ED PROVIDER NOTE - OBJECTIVE STATEMENT
54 y/o F with PMHx of DM and fibroids presents to ED c/o non-radiating lower abdominal pain and rectal pain x 1 week. Also endorsing painful urination. Has an OBGYN which she follows with, last pelvic exam was "several years ago" but had a TVUS within the past 12 months to evaluate the size of her fibroids. She denies taking anything for pain PTA. Denies fevers, chest pain, difficulty breathing, nausea, vomiting, diarrhea, blood in the stool, hematuria or back pain. NKDA.

## 2023-08-19 NOTE — H&P ADULT - PROBLEM SELECTOR PLAN 5
Lovenox sq Hx of colon mass excision in 2021,reportedly benign , no chemotherapy was administered, however pt not certain about the diagnosis;     -Colonoscopy inpt vs f/u with outpt GI regarding surveillance and continuity of care

## 2023-08-19 NOTE — CONSULT NOTE ADULT - ASSESSMENT
Chloe Treviño is a 53 y.o. woman with history of DM, uterine fibroids, sigmoidectomy (~2000, unclear if pathology was malignant), and chronic constipation who presented to the ED on 8/19 complaining of 1 week of intermittent lower abdominal pain. CT demonstrates a patulous, but patent and viable, rectosigmoid anastomosis consistent with surgical history. Significant stool burden in colon consistent with history of chronic constipation.    PLAN:  - No acute surgical intervention indicated  - Some component of the pain may be due to stool in colon; would benefit from bowel regimen including both laxative and stool softener. Suppository may also help.  - Pain may also be related to uterine fibroid disease  - Patient reports no recent colonoscopy; given age and history should follow up outpatient for colonoscopy or return to her surgeon at [facility]    ***THIS NOTE IS NOT FINAL. PENDING ATTENDING ATTESTATION*** Chloe Treviño is a 53 y.o. woman with history of DM, uterine fibroids, sigmoidectomy (~2000, unclear if pathology was malignant), and chronic constipation who presented to the ED on 8/19 complaining of 1 week of intermittent lower abdominal pain. CT demonstrates a patulous, but patent and viable, rectosigmoid anastomosis consistent with surgical history. Significant stool burden in colon consistent with history of chronic constipation.    PLAN:  - No acute surgical intervention indicated  - Some component of the pain may be due to stool in colon; would benefit from bowel regimen including both laxative and stool softener. Suppository may also help.  - Pain may also be related to uterine fibroid disease  - Patient reports no recent colonoscopy; given age and history should follow up outpatient for colonoscopy or return to her surgeon at [facility]

## 2023-08-19 NOTE — ED PROVIDER NOTE - ATTENDING CONTRIBUTION TO CARE
Afebrile. Awake and Alert. Lungs CTA. Heart RRR. Abdomen soft, SP TTP, ND. CN II-XII grossly intact. Moves all extremities without lateralization.

## 2023-08-19 NOTE — ED PROVIDER NOTE - PATIENT PORTAL LINK FT
You can access the FollowMyHealth Patient Portal offered by [facility] by registering at the following website: [email or web address]. By joining [employer]’s FollowMyHealth portal, you will also be able to view your health information using other applications (apps) compatible with our system.

## 2023-08-19 NOTE — H&P ADULT - PROBLEM SELECTOR PLAN 1
Febrile, Cctw=386.2, Tachycardia, HR= 100s-90s  Likely GI source as below; No respiratory symptoms, no CXR indicated;  -VS q4h  -NS IV  -Monitor Lactate, VBG in AM  -BCx pending   -UCx pending   -Started empiric Zosyn IV

## 2023-08-19 NOTE — H&P ADULT - NSHPLABSRESULTS_GEN_ALL_CORE
.    -Personally interpreted EKG: pending    -Personally reviewed the following labs below:                        13.7   10.60 )-----------( 209      ( 19 Aug 2023 03:00 )             40.4   08-19    135  |  98  |  10  ----------------------------<  298<H>  3.9   |  25  |  0.33<L>    Ca    9.7      19 Aug 2023 03:00    TPro  7.4  /  Alb  4.3  /  TBili  0.7  /  DBili  x   /  AST  26  /  ALT  44<H>  /  AlkPhos  116  08-19    Urinalysis Basic - ( 19 Aug 2023 03:00 )    Color: x / Appearance: x / SG: x / pH: x  Gluc: 298 mg/dL / Ketone: x  / Bili: x / Urobili: x   Blood: x / Protein: x / Nitrite: x   Leuk Esterase: x / RBC: x / WBC x   Sq Epi: x / Non Sq Epi: x / Bacteria: x    04:50 - VBG - pH: 7.36  | pCO2: 44    | pO2: 57    | Lactate: 1.7      -Personally reviewed: CT ABDOMEN AND PELVIS IC   ORDERED BY: BRUCE CONDE     *** ADDENDUM # 1 ***  Typo in the reproduction section. Punctate nonspecific enhancement right labia  --- End of Report ---  *** END OF ADDENDUM # 1 ***    PROCEDURE DATE:  08/19/2023    INTERPRETATION:  CLINICAL INFORMATION: Non-radiating lower abdominal pain and rectal pain x 1 week  COMPARISON: None.  CONTRAST/COMPLICATIONS:  IV Contrast: Omnipaque 350  95 cc administered   5 cc discarded  Oral Contrast: NONE  Complications: None reported at time of study completion  PROCEDURE:  CT of the Abdomen and Pelvis was performed.  Sagittal and coronal reformats were performed.  FINDINGS:  LOWER CHEST: Bibasilar atelectasis.  LIVER: Hepatomegaly and steatosis.  BILE DUCTS: Normal caliber.  GALLBLADDER: Within normal limits.  SPLEEN: Within normal limits.  PANCREAS: Within normal limits.  ADRENALS: Within normal limits.  KIDNEYS/URETERS: Within normal limits. No hydronephrosis.  BLADDER: Within normal limits.  REPRODUCTIVE ORGANS: IUD in place. Fibroid uterus. Right myometrial 4.8 x   5.7 x 3.8 cm low attenuating abnormality likely degenerating myoma. The   adnexa are not clearly delineated  Nonspecific involving the right labia  BOWEL: No bowel obstruction. Sigmoid anastomosis is a patulous appearance   suggesting a J-pouch which be correlated with surgical history. Appendix   is normal.  PERITONEUM: No ascites.  VESSELS: Circumferential left renal vein  . RETROPERITONEUM/LYMPH NODES: No lymphadenopathy.  ABDOMINAL WALL: Small umbilical hernia..  BONES: Within normal limits.  IMPRESSION:  Low attenuating right intramural abnormality suggesting a degenerating   myoma measuring 4.8 x 5.7 x 3.8 cm in size.  Sigmoid anastomosis identified with evidence suggestive of possible   J-pouch should be correlated with patient's surgical history given the   somewhat atypical anastomotic features      -Personally reviewed:    US TRANSVAGINAL   ORDERED BY: AILYN MONTOYA   PROCEDURE DATE:  08/19/2023    INTERPRETATION:  CLINICAL INFORMATION: 53-year-old female with suprapubic pain  LMP: 2022  COMPARISON: CT of the same date  TECHNIQUE:  Endovaginal pelvic sonogram only.  FINDINGS:  Uterus: 10.1 x 6.4 x 7.5 cm. Enlarged. 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 IUD again noted.  Right ovary not visualized.  Tubular cystic appearing structure region left adnexa which may present   hydrosalpinx (also suggested on CT series 2 images )  Fluid: None.  IMPRESSION:  Enlarged uterus with lipoleiomyoma.  Questionable left hydrosalpinx. .    -Personally interpreted EKG: sinus tach 103 bpm, qtc 450, no acute Tw or ST changes, no PACs or PVCs    -Personally reviewed the following labs below:                        13.7   10.60 )-----------( 209      ( 19 Aug 2023 03:00 )             40.4   08-19    135  |  98  |  10  ----------------------------<  298<H>  3.9   |  25  |  0.33<L>    Ca    9.7      19 Aug 2023 03:00    TPro  7.4  /  Alb  4.3  /  TBili  0.7  /  DBili  x   /  AST  26  /  ALT  44<H>  /  AlkPhos  116  08-19    Urinalysis Basic - ( 19 Aug 2023 03:00 )    Color: x / Appearance: x / SG: x / pH: x  Gluc: 298 mg/dL / Ketone: x  / Bili: x / Urobili: x   Blood: x / Protein: x / Nitrite: x   Leuk Esterase: x / RBC: x / WBC x   Sq Epi: x / Non Sq Epi: x / Bacteria: x    04:50 - VBG - pH: 7.36  | pCO2: 44    | pO2: 57    | Lactate: 1.7      -Personally reviewed: CT ABDOMEN AND PELVIS IC   ORDERED BY: BRUCE CONDE     *** ADDENDUM # 1 ***  Typo in the reproduction section. Punctate nonspecific enhancement right labia  --- End of Report ---  *** END OF ADDENDUM # 1 ***    PROCEDURE DATE:  08/19/2023    INTERPRETATION:  CLINICAL INFORMATION: Non-radiating lower abdominal pain and rectal pain x 1 week  COMPARISON: None.  CONTRAST/COMPLICATIONS:  IV Contrast: Omnipaque 350  95 cc administered   5 cc discarded  Oral Contrast: NONE  Complications: None reported at time of study completion  PROCEDURE:  CT of the Abdomen and Pelvis was performed.  Sagittal and coronal reformats were performed.  FINDINGS:  LOWER CHEST: Bibasilar atelectasis.  LIVER: Hepatomegaly and steatosis.  BILE DUCTS: Normal caliber.  GALLBLADDER: Within normal limits.  SPLEEN: Within normal limits.  PANCREAS: Within normal limits.  ADRENALS: Within normal limits.  KIDNEYS/URETERS: Within normal limits. No hydronephrosis.  BLADDER: Within normal limits.  REPRODUCTIVE ORGANS: IUD in place. Fibroid uterus. Right myometrial 4.8 x   5.7 x 3.8 cm low attenuating abnormality likely degenerating myoma. The   adnexa are not clearly delineated  Nonspecific involving the right labia  BOWEL: No bowel obstruction. Sigmoid anastomosis is a patulous appearance   suggesting a J-pouch which be correlated with surgical history. Appendix   is normal.  PERITONEUM: No ascites.  VESSELS: Circumferential left renal vein  . RETROPERITONEUM/LYMPH NODES: No lymphadenopathy.  ABDOMINAL WALL: Small umbilical hernia..  BONES: Within normal limits.  IMPRESSION:  Low attenuating right intramural abnormality suggesting a degenerating   myoma measuring 4.8 x 5.7 x 3.8 cm in size.  Sigmoid anastomosis identified with evidence suggestive of possible   J-pouch should be correlated with patient's surgical history given the   somewhat atypical anastomotic features      -Personally reviewed:    US TRANSVAGINAL   ORDERED BY: AILYN MONTOYA   PROCEDURE DATE:  08/19/2023    INTERPRETATION:  CLINICAL INFORMATION: 53-year-old female with suprapubic pain  LMP: 2022  COMPARISON: CT of the same date  TECHNIQUE:  Endovaginal pelvic sonogram only.  FINDINGS:  Uterus: 10.1 x 6.4 x 7.5 cm. Enlarged. 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 IUD again noted.  Right ovary not visualized.  Tubular cystic appearing structure region left adnexa which may present   hydrosalpinx (also suggested on CT series 2 images )  Fluid: None.  IMPRESSION:  Enlarged uterus with lipoleiomyoma.  Questionable left hydrosalpinx. .    -Personally interpreted EKG: sinus tach 103 bpm, qtc 450, no acute Tw or ST changes, no PACs or PVCs - unchanged compared to April 1, 2020 EKG    -Personally reviewed the following labs below:                        13.7   10.60 )-----------( 209      ( 19 Aug 2023 03:00 )             40.4   08-19    135  |  98  |  10  ----------------------------<  298<H>  3.9   |  25  |  0.33<L>    Ca    9.7      19 Aug 2023 03:00    TPro  7.4  /  Alb  4.3  /  TBili  0.7  /  DBili  x   /  AST  26  /  ALT  44<H>  /  AlkPhos  116  08-19    Urinalysis Basic - ( 19 Aug 2023 03:00 )    Color: x / Appearance: x / SG: x / pH: x  Gluc: 298 mg/dL / Ketone: x  / Bili: x / Urobili: x   Blood: x / Protein: x / Nitrite: x   Leuk Esterase: x / RBC: x / WBC x   Sq Epi: x / Non Sq Epi: x / Bacteria: x    04:50 - VBG - pH: 7.36  | pCO2: 44    | pO2: 57    | Lactate: 1.7      -Personally reviewed: CT ABDOMEN AND PELVIS IC   ORDERED BY: BRUCE CONDE     *** ADDENDUM # 1 ***  Typo in the reproduction section. Punctate nonspecific enhancement right labia  --- End of Report ---  *** END OF ADDENDUM # 1 ***    PROCEDURE DATE:  08/19/2023    INTERPRETATION:  CLINICAL INFORMATION: Non-radiating lower abdominal pain and rectal pain x 1 week  COMPARISON: None.  CONTRAST/COMPLICATIONS:  IV Contrast: Omnipaque 350  95 cc administered   5 cc discarded  Oral Contrast: NONE  Complications: None reported at time of study completion  PROCEDURE:  CT of the Abdomen and Pelvis was performed.  Sagittal and coronal reformats were performed.  FINDINGS:  LOWER CHEST: Bibasilar atelectasis.  LIVER: Hepatomegaly and steatosis.  BILE DUCTS: Normal caliber.  GALLBLADDER: Within normal limits.  SPLEEN: Within normal limits.  PANCREAS: Within normal limits.  ADRENALS: Within normal limits.  KIDNEYS/URETERS: Within normal limits. No hydronephrosis.  BLADDER: Within normal limits.  REPRODUCTIVE ORGANS: IUD in place. Fibroid uterus. Right myometrial 4.8 x   5.7 x 3.8 cm low attenuating abnormality likely degenerating myoma. The   adnexa are not clearly delineated  Nonspecific involving the right labia  BOWEL: No bowel obstruction. Sigmoid anastomosis is a patulous appearance   suggesting a J-pouch which be correlated with surgical history. Appendix   is normal.  PERITONEUM: No ascites.  VESSELS: Circumferential left renal vein  . RETROPERITONEUM/LYMPH NODES: No lymphadenopathy.  ABDOMINAL WALL: Small umbilical hernia..  BONES: Within normal limits.  IMPRESSION:  Low attenuating right intramural abnormality suggesting a degenerating   myoma measuring 4.8 x 5.7 x 3.8 cm in size.  Sigmoid anastomosis identified with evidence suggestive of possible   J-pouch should be correlated with patient's surgical history given the   somewhat atypical anastomotic features      -Personally reviewed:    US TRANSVAGINAL   ORDERED BY: AILYN MONTOYA   PROCEDURE DATE:  08/19/2023    INTERPRETATION:  CLINICAL INFORMATION: 53-year-old female with suprapubic pain  LMP: 2022  COMPARISON: CT of the same date  TECHNIQUE:  Endovaginal pelvic sonogram only.  FINDINGS:  Uterus: 10.1 x 6.4 x 7.5 cm. Enlarged. 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 IUD again noted.  Right ovary not visualized.  Tubular cystic appearing structure region left adnexa which may present   hydrosalpinx (also suggested on CT series 2 images )  Fluid: None.  IMPRESSION:  Enlarged uterus with lipoleiomyoma.  Questionable left hydrosalpinx.

## 2023-08-19 NOTE — H&P ADULT - PROBLEM SELECTOR PLAN 2
Potentially multifactorial due to degenerating myoma, and chronic constipation;    Abnormal CT A/P:  -Low attenuating right intramural abnormality suggesting a degenerating myoma measuring 4.8 x 5.7 x 3.8 cm in size.  -Sigmoid anastomosis identified with evidence suggestive of possible J-pouch should be correlated with patient's surgical history given the somewhat atypical anastomotic features    -Gyn consulted, pending formal recommendations   -Gen Sx consulted, bowel regimen including both laxative and stool softener; suppository;  -Primary team to consider GI c/s for colonoscopy inpatient    -Empiric Zosyn as above given sepsis of likely GI source   -NPO for now

## 2023-08-19 NOTE — CONSULT NOTE ADULT - ASSESSMENT
A/P:  54yo  (LMP ~1.5 years ago) w/ PMHx of fibroid uterus and T2DM presenting with lower abdominal pain and rectal pain for 1 week. Patient with benign abdominal exam. Pain is possibly 2/2 degenerating fibroid.   - No acute GYN intervention  - Recommend NSAIDs (Toradol 30mg q6h) for pain management  - Consider surgery consult for rectal pressure/pain i/s/o ?colonic resection in   - Patient may follow up with [facility]YN clinic to establish care if she wishes:       [facility]       Oncology [facility], [place] [address]       [phone]    Patient seen with Dr. Ryan Gomez, PGY-2

## 2023-08-19 NOTE — H&P ADULT - NSHPPHYSICALEXAM_GEN_ALL_CORE
Vital Signs Last 24 Hrs  T(C): 37.5 (20 Aug 2023 00:33), Max: 38.4 (19 Aug 2023 23:23)  T(F): 99.5 (20 Aug 2023 00:33), Max: 101.2 (19 Aug 2023 23:23)  HR: 96 (20 Aug 2023 00:33) (72 - 102)  BP: 150/84 (20 Aug 2023 00:33) (131/89 - 155/93)  BP(mean): --  RR: 16 (20 Aug 2023 00:33) (16 - 18)  SpO2: 95% (20 Aug 2023 00:33) (95% - 100%)    Parameters below as of 19 Aug 2023 20:17  Patient On (Oxygen Delivery Method): room air

## 2023-08-19 NOTE — H&P ADULT - PROBLEM SELECTOR PLAN 4
On Metformin; Was on Lantus in  the past; A1c 11.4 in 2020;  BG= 200s-300s    -Hold Metformin  -ISS  -Hold starting Lantus, awaiting A1c   -NPO for now, hypoglycemia protocol On Metformin; Was on Lantus in  the past; A1c 11.4 in 2020;  BG= 200s-300s    -Hold Metformin  -ISS  -Hold starting Lantus, awaiting A1c   -NPO for now, hypoglycemia protocol    Addendum, 2:50am, A1c=12.2  -Start conservative dose of Lantus 10 units in AM while NPO   -Formal Endocrinology c/s in AM, primary team

## 2023-08-19 NOTE — ED ADULT NURSE REASSESSMENT NOTE - GENERAL PATIENT STATE
Impression: Hypermetropia, bilateral: H52.03. Plan: New glasses Rx given today. Minor improvement in vision.  Recommend deferring until after seeing Dr Iraj Reid
comfortable appearance/resting/sleeping

## 2023-08-19 NOTE — ED PROVIDER NOTE - NSFOLLOWUPINSTRUCTIONS_ED_ALL_ED_FT
You were seen in the emergency department for abdominal pain.  Please read all attached patient information, read all additional instructions below, and follow-up with all providers as directed.    1) Follow-up with your primary care provider in 1 week. Follow up with your ob/gyn within 1 week    2) Continue to take all medications as prescribed. We sent a medication to your pharmacy called ALBENdazole, which you should take on Saturday 9/2/23 (two weeks from today). We also sent a vaginal suppository called METROnidazole, which you should use daily for the next 5 days.    3) Rest and stay hydrated. Pain can be managed with Acetaminophen (aka Tylenol) and Ibuprofen (aka Motrin or Advil) over the counter as directed.    4) Return to the ER for any new or worsening symptoms.      Please read all attached patient information. You were seen in the emergency department for abdominal pain.  Please read all attached patient information, read all additional instructions below, and follow-up with all providers as directed.    1) Follow-up with your primary care provider in 1 week. Follow up with your ob/gyn within 1 week    2) Continue to take all medications as prescribed. We sent a medication to your pharmacy called ALBENdazole, which you should take on Saturday 9/2/23 (two weeks from today). We also sent a vaginal suppository called METROnidazole, which you should use daily for the next 5 days. We are sending you a medication to help with your pain with urination called PYRIDIUM; please take this as prescribed.    3) Rest and stay hydrated. Pain can be managed with Acetaminophen (aka Tylenol) and Ibuprofen (aka Motrin or Advil) over the counter as directed.    4) Return to the ER for any new or worsening symptoms.      Please read all attached patient information.

## 2023-08-19 NOTE — ED ADULT NURSE NOTE - OBJECTIVE STATEMENT
pt received to room 14 A&Ox4 c/o pelvic pain radiating to buttock since Sunday, discomfort when using the bathroom. hx colon surgery 2 years ago.

## 2023-08-20 DIAGNOSIS — A41.9 SEPSIS, UNSPECIFIED ORGANISM: ICD-10-CM

## 2023-08-20 DIAGNOSIS — E11.65 TYPE 2 DIABETES MELLITUS WITH HYPERGLYCEMIA: ICD-10-CM

## 2023-08-20 DIAGNOSIS — K63.89 OTHER SPECIFIED DISEASES OF INTESTINE: ICD-10-CM

## 2023-08-20 DIAGNOSIS — R10.31 RIGHT LOWER QUADRANT PAIN: ICD-10-CM

## 2023-08-20 DIAGNOSIS — R30.0 DYSURIA: ICD-10-CM

## 2023-08-20 DIAGNOSIS — E04.2 NONTOXIC MULTINODULAR GOITER: ICD-10-CM

## 2023-08-20 DIAGNOSIS — Z29.9 ENCOUNTER FOR PROPHYLACTIC MEASURES, UNSPECIFIED: ICD-10-CM

## 2023-08-20 LAB
A1C WITH ESTIMATED AVERAGE GLUCOSE RESULT: 12.2 % — HIGH (ref 4–5.6)
ANION GAP SERPL CALC-SCNC: 12 MMOL/L — SIGNIFICANT CHANGE UP (ref 7–14)
BASE EXCESS BLDV CALC-SCNC: -2.1 MMOL/L — LOW (ref -2–3)
BASOPHILS # BLD AUTO: 0.04 K/UL — SIGNIFICANT CHANGE UP (ref 0–0.2)
BASOPHILS NFR BLD AUTO: 0.3 % — SIGNIFICANT CHANGE UP (ref 0–2)
BLOOD GAS VENOUS COMPREHENSIVE RESULT: SIGNIFICANT CHANGE UP
BUN SERPL-MCNC: 7 MG/DL — SIGNIFICANT CHANGE UP (ref 7–23)
CALCIUM SERPL-MCNC: 8.9 MG/DL — SIGNIFICANT CHANGE UP (ref 8.4–10.5)
CHLORIDE BLDV-SCNC: 101 MMOL/L — SIGNIFICANT CHANGE UP (ref 96–108)
CHLORIDE SERPL-SCNC: 97 MMOL/L — LOW (ref 98–107)
CO2 BLDV-SCNC: 21.8 MMOL/L — LOW (ref 22–26)
CO2 SERPL-SCNC: 23 MMOL/L — SIGNIFICANT CHANGE UP (ref 22–31)
CREAT SERPL-MCNC: 0.3 MG/DL — LOW (ref 0.5–1.3)
EGFR: 127 ML/MIN/1.73M2 — SIGNIFICANT CHANGE UP
EOSINOPHIL # BLD AUTO: 0.1 K/UL — SIGNIFICANT CHANGE UP (ref 0–0.5)
EOSINOPHIL NFR BLD AUTO: 0.8 % — SIGNIFICANT CHANGE UP (ref 0–6)
ESTIMATED AVERAGE GLUCOSE: 303 — SIGNIFICANT CHANGE UP
GAS PNL BLDV: 128 MMOL/L — LOW (ref 136–145)
GLUCOSE BLDC GLUCOMTR-MCNC: 212 MG/DL — HIGH (ref 70–99)
GLUCOSE BLDC GLUCOMTR-MCNC: 252 MG/DL — HIGH (ref 70–99)
GLUCOSE BLDC GLUCOMTR-MCNC: 270 MG/DL — HIGH (ref 70–99)
GLUCOSE BLDC GLUCOMTR-MCNC: 339 MG/DL — HIGH (ref 70–99)
GLUCOSE BLDV-MCNC: 211 MG/DL — HIGH (ref 70–99)
GLUCOSE SERPL-MCNC: 225 MG/DL — HIGH (ref 70–99)
HCO3 BLDV-SCNC: 21 MMOL/L — LOW (ref 22–29)
HCT VFR BLD CALC: 36 % — SIGNIFICANT CHANGE UP (ref 34.5–45)
HCT VFR BLDA CALC: 39 % — SIGNIFICANT CHANGE UP (ref 34.5–46.5)
HGB BLD CALC-MCNC: 13 G/DL — SIGNIFICANT CHANGE UP (ref 11.7–16.1)
HGB BLD-MCNC: 12.6 G/DL — SIGNIFICANT CHANGE UP (ref 11.5–15.5)
IANC: 9.22 K/UL — HIGH (ref 1.8–7.4)
IMM GRANULOCYTES NFR BLD AUTO: 0.4 % — SIGNIFICANT CHANGE UP (ref 0–0.9)
LACTATE BLDV-MCNC: 1 MMOL/L — SIGNIFICANT CHANGE UP (ref 0.5–2)
LYMPHOCYTES # BLD AUTO: 17.9 % — SIGNIFICANT CHANGE UP (ref 13–44)
LYMPHOCYTES # BLD AUTO: 2.31 K/UL — SIGNIFICANT CHANGE UP (ref 1–3.3)
MAGNESIUM SERPL-MCNC: 1.8 MG/DL — SIGNIFICANT CHANGE UP (ref 1.6–2.6)
MCHC RBC-ENTMCNC: 30.2 PG — SIGNIFICANT CHANGE UP (ref 27–34)
MCHC RBC-ENTMCNC: 35 GM/DL — SIGNIFICANT CHANGE UP (ref 32–36)
MCV RBC AUTO: 86.3 FL — SIGNIFICANT CHANGE UP (ref 80–100)
MONOCYTES # BLD AUTO: 1.18 K/UL — HIGH (ref 0–0.9)
MONOCYTES NFR BLD AUTO: 9.1 % — SIGNIFICANT CHANGE UP (ref 2–14)
NEUTROPHILS # BLD AUTO: 9.22 K/UL — HIGH (ref 1.8–7.4)
NEUTROPHILS NFR BLD AUTO: 71.5 % — SIGNIFICANT CHANGE UP (ref 43–77)
NRBC # BLD: 0 /100 WBCS — SIGNIFICANT CHANGE UP (ref 0–0)
NRBC # FLD: 0 K/UL — SIGNIFICANT CHANGE UP (ref 0–0)
PCO2 BLDV: 30 MMHG — LOW (ref 39–52)
PH BLDV: 7.45 — HIGH (ref 7.32–7.43)
PHOSPHATE SERPL-MCNC: 2.2 MG/DL — LOW (ref 2.5–4.5)
PLATELET # BLD AUTO: 185 K/UL — SIGNIFICANT CHANGE UP (ref 150–400)
PO2 BLDV: 194 MMHG — HIGH (ref 25–45)
POTASSIUM BLDV-SCNC: 3.6 MMOL/L — SIGNIFICANT CHANGE UP (ref 3.5–5.1)
POTASSIUM SERPL-MCNC: 3.7 MMOL/L — SIGNIFICANT CHANGE UP (ref 3.5–5.3)
POTASSIUM SERPL-SCNC: 3.7 MMOL/L — SIGNIFICANT CHANGE UP (ref 3.5–5.3)
RBC # BLD: 4.17 M/UL — SIGNIFICANT CHANGE UP (ref 3.8–5.2)
RBC # FLD: 11.9 % — SIGNIFICANT CHANGE UP (ref 10.3–14.5)
SAO2 % BLDV: 99.3 % — HIGH (ref 67–88)
SODIUM SERPL-SCNC: 132 MMOL/L — LOW (ref 135–145)
WBC # BLD: 12.9 K/UL — HIGH (ref 3.8–10.5)
WBC # FLD AUTO: 12.9 K/UL — HIGH (ref 3.8–10.5)

## 2023-08-20 PROCEDURE — 99223 1ST HOSP IP/OBS HIGH 75: CPT

## 2023-08-20 PROCEDURE — 99233 SBSQ HOSP IP/OBS HIGH 50: CPT

## 2023-08-20 RX ORDER — INSULIN GLARGINE 100 [IU]/ML
10 INJECTION, SOLUTION SUBCUTANEOUS EVERY MORNING
Refills: 0 | Status: DISCONTINUED | OUTPATIENT
Start: 2023-08-20 | End: 2023-08-20

## 2023-08-20 RX ORDER — INSULIN LISPRO 100/ML
VIAL (ML) SUBCUTANEOUS AT BEDTIME
Refills: 0 | Status: DISCONTINUED | OUTPATIENT
Start: 2023-08-20 | End: 2023-08-22

## 2023-08-20 RX ORDER — ENOXAPARIN SODIUM 100 MG/ML
40 INJECTION SUBCUTANEOUS EVERY 24 HOURS
Refills: 0 | Status: DISCONTINUED | OUTPATIENT
Start: 2023-08-20 | End: 2023-08-22

## 2023-08-20 RX ORDER — INSULIN GLARGINE 100 [IU]/ML
16 INJECTION, SOLUTION SUBCUTANEOUS EVERY MORNING
Refills: 0 | Status: DISCONTINUED | OUTPATIENT
Start: 2023-08-20 | End: 2023-08-22

## 2023-08-20 RX ORDER — POLYETHYLENE GLYCOL 3350 17 G/17G
17 POWDER, FOR SOLUTION ORAL EVERY 12 HOURS
Refills: 0 | Status: DISCONTINUED | OUTPATIENT
Start: 2023-08-20 | End: 2023-08-22

## 2023-08-20 RX ORDER — INSULIN LISPRO 100/ML
VIAL (ML) SUBCUTANEOUS
Refills: 0 | Status: DISCONTINUED | OUTPATIENT
Start: 2023-08-20 | End: 2023-08-22

## 2023-08-20 RX ORDER — ACETAMINOPHEN 500 MG
650 TABLET ORAL ONCE
Refills: 0 | Status: COMPLETED | OUTPATIENT
Start: 2023-08-20 | End: 2023-08-20

## 2023-08-20 RX ORDER — INSULIN GLARGINE 100 [IU]/ML
7 INJECTION, SOLUTION SUBCUTANEOUS AT BEDTIME
Refills: 0 | Status: DISCONTINUED | OUTPATIENT
Start: 2023-08-20 | End: 2023-08-20

## 2023-08-20 RX ORDER — SENNA PLUS 8.6 MG/1
2 TABLET ORAL AT BEDTIME
Refills: 0 | Status: DISCONTINUED | OUTPATIENT
Start: 2023-08-20 | End: 2023-08-22

## 2023-08-20 RX ORDER — INSULIN LISPRO 100/ML
5 VIAL (ML) SUBCUTANEOUS
Refills: 0 | Status: DISCONTINUED | OUTPATIENT
Start: 2023-08-20 | End: 2023-08-22

## 2023-08-20 RX ADMIN — MORPHINE SULFATE 4 MILLIGRAM(S): 50 CAPSULE, EXTENDED RELEASE ORAL at 19:51

## 2023-08-20 RX ADMIN — ONDANSETRON 4 MILLIGRAM(S): 8 TABLET, FILM COATED ORAL at 14:20

## 2023-08-20 RX ADMIN — Medication 650 MILLIGRAM(S): at 20:20

## 2023-08-20 RX ADMIN — Medication 1: at 22:45

## 2023-08-20 RX ADMIN — SENNA PLUS 2 TABLET(S): 8.6 TABLET ORAL at 00:44

## 2023-08-20 RX ADMIN — Medication 650 MILLIGRAM(S): at 15:15

## 2023-08-20 RX ADMIN — MORPHINE SULFATE 4 MILLIGRAM(S): 50 CAPSULE, EXTENDED RELEASE ORAL at 00:18

## 2023-08-20 RX ADMIN — PIPERACILLIN AND TAZOBACTAM 25 GRAM(S): 4; .5 INJECTION, POWDER, LYOPHILIZED, FOR SOLUTION INTRAVENOUS at 04:02

## 2023-08-20 RX ADMIN — PIPERACILLIN AND TAZOBACTAM 25 GRAM(S): 4; .5 INJECTION, POWDER, LYOPHILIZED, FOR SOLUTION INTRAVENOUS at 09:16

## 2023-08-20 RX ADMIN — PIPERACILLIN AND TAZOBACTAM 200 GRAM(S): 4; .5 INJECTION, POWDER, LYOPHILIZED, FOR SOLUTION INTRAVENOUS at 00:23

## 2023-08-20 RX ADMIN — Medication 4: at 13:37

## 2023-08-20 RX ADMIN — POLYETHYLENE GLYCOL 3350 17 GRAM(S): 17 POWDER, FOR SOLUTION ORAL at 00:57

## 2023-08-20 RX ADMIN — Medication 650 MILLIGRAM(S): at 06:32

## 2023-08-20 RX ADMIN — MORPHINE SULFATE 4 MILLIGRAM(S): 50 CAPSULE, EXTENDED RELEASE ORAL at 20:20

## 2023-08-20 RX ADMIN — Medication 2 UNIT(S): at 00:28

## 2023-08-20 RX ADMIN — Medication 3: at 17:52

## 2023-08-20 RX ADMIN — MORPHINE SULFATE 2 MILLIGRAM(S): 50 CAPSULE, EXTENDED RELEASE ORAL at 07:30

## 2023-08-20 RX ADMIN — MORPHINE SULFATE 2 MILLIGRAM(S): 50 CAPSULE, EXTENDED RELEASE ORAL at 06:33

## 2023-08-20 RX ADMIN — INSULIN GLARGINE 16 UNIT(S): 100 INJECTION, SOLUTION SUBCUTANEOUS at 22:32

## 2023-08-20 RX ADMIN — INSULIN GLARGINE 10 UNIT(S): 100 INJECTION, SOLUTION SUBCUTANEOUS at 09:13

## 2023-08-20 RX ADMIN — PIPERACILLIN AND TAZOBACTAM 25 GRAM(S): 4; .5 INJECTION, POWDER, LYOPHILIZED, FOR SOLUTION INTRAVENOUS at 19:40

## 2023-08-20 RX ADMIN — Medication 2: at 09:13

## 2023-08-20 RX ADMIN — POLYETHYLENE GLYCOL 3350 17 GRAM(S): 17 POWDER, FOR SOLUTION ORAL at 11:12

## 2023-08-20 NOTE — PROGRESS NOTE ADULT - PROBLEM SELECTOR PLAN 4
On Metformin; Was on Lantus in  the past; A1c 11.4 in 2020;  BG= 200s-300s    -Hold Metformin  -ISS  -Hold starting Lantus, awaiting A1c   -NPO for now, hypoglycemia protocol    Addendum, 2:50am, A1c=12.2  -Start conservative dose of Lantus 10 units in AM while NPO   -Formal Endocrinology c/s in AM, primary team

## 2023-08-20 NOTE — CONSULT NOTE ADULT - SUBJECTIVE AND OBJECTIVE BOX
HPI:  52 y/o Female with MHx of DM Type 2, fibroids, h/o colon mass excision in 2021 (reportedly benign , no chemotherapy was dministered) c/o severe non-radiating lower abdominal pain and rectal pain x 1 week. Also endorsing painful urination. Pt reports f/u with  OBGYN outpt but last pelvic exam was "several years ago". States that had a TVUS within the past 12 months to evaluate the size of her fibroids. She reports not taking anything for the pain control PTA. Reports also no fevers, chest pain, difficulty breathing, nausea, vomiting, diarrhea, blood in the stool, hematuria or back pain. (19 Aug 2023 22:32)  Endocrine history.  Told of type 2 diabetes 10 years ago but not currently on routine care.  Previous hemoglobin A1c was 11.4.  Patient takes metformin intermittently if sugars are high but does not take it on a regular sustained basis.  Often has blood sugars in 2-3 100s but has not addressed this with her primary care doctor.  Has not seen an endocrinologist.  Denies symptoms of polyuria polydipsia.  Denies endorgan damage but has not seen an ophthalmologist recently.  Reports weight is stable.  Does not take other medications such as statins.  A1c 12.2%  History of nodular goiter.  Apparently had a fine-needle aspiration biopsy in the past although no details are available.  Patient asymptomatic.  PAST MEDICAL & SURGICAL HISTORY:  Type 2 diabetes mellitus      No significant past surgical history    FAMILY HISTORY:  FH: type 2 diabetes    Social History:Denies tobacco use.  Outpatient Medications:Intermittent metformin.  MEDICATIONS  (STANDING):  acetaminophen     Tablet .. 650 milliGRAM(s) Oral every 8 hours  dextrose 5%. 1000 milliLiter(s) (100 mL/Hr) IV Continuous <Continuous>  dextrose 5%. 1000 milliLiter(s) (50 mL/Hr) IV Continuous <Continuous>  dextrose 50% Injectable 25 Gram(s) IV Push once  dextrose 50% Injectable 12.5 Gram(s) IV Push once  dextrose 50% Injectable 25 Gram(s) IV Push once  enoxaparin Injectable 40 milliGRAM(s) SubCutaneous every 24 hours  glucagon  Injectable 1 milliGRAM(s) IntraMuscular once  insulin glargine Injectable (LANTUS) 10 Unit(s) SubCutaneous every morning  insulin lispro (ADMELOG) corrective regimen sliding scale   SubCutaneous at bedtime  insulin lispro (ADMELOG) corrective regimen sliding scale   SubCutaneous three times a day before meals  piperacillin/tazobactam IVPB.- 3.375 Gram(s) IV Intermittent once  piperacillin/tazobactam IVPB.. 3.375 Gram(s) IV Intermittent every 8 hours  polyethylene glycol 3350 17 Gram(s) Oral every 12 hours  senna 2 Tablet(s) Oral at bedtime  sodium chloride 0.9%. 1000 milliLiter(s) (100 mL/Hr) IV Continuous <Continuous>    MEDICATIONS  (PRN):  dextrose Oral Gel 15 Gram(s) Oral once PRN Blood Glucose LESS THAN 70 milliGRAM(s)/deciliter  melatonin 3 milliGRAM(s) Oral at bedtime PRN Insomnia  morphine  - Injectable 4 milliGRAM(s) IV Push every 6 hours PRN Severe Pain (7 - 10)  morphine  - Injectable 2 milliGRAM(s) IV Push every 4 hours PRN Moderate Pain (4 - 6)  ondansetron Injectable 4 milliGRAM(s) IV Push every 8 hours PRN Nausea and/or Vomiting      Allergies No Known Allergies       Review of Systems:  Constitutional: No fever  Eyes: No blurry vision  Neuro: No tremors  HEENT: No pain  Cardiovascular: No chest pain, palpitations  Respiratory: No SOB, no cough  GI: No nausea, vomiting, abdominal pain  : No dysuria  Skin: no rash  Psych: no depression  Endocrine: no polyuria, polydipsia    PHYSICAL EXAM:  VITALS: T(C): 36.6 (08-20-23 @ 12:23)  T(F): 97.8 (08-20-23 @ 12:23), Max: 101.2 (08-19-23 @ 23:23)  HR: 88 (08-20-23 @ 12:23) (72 - 102)  BP: 144/88 (08-20-23 @ 12:23) (133/67 - 155/93)  RR:  (16 - 18)  SpO2:  (95% - 100%)  Wt(kg): -66-  GENERAL: NAD, well-groomed, well-developed  EYES: No proptosis, no lid lag, anicteric  HEENT:  Atraumatic, Normocephalic, moist mucous membranes  THYROID:  nodular goiter R > L  RESPIRATORY: Clear to auscultation bilaterally; No rales, rhonchi, wheezing, or rubs  CARDIOVASCULAR: Regular rate and rhythm; No murmurs; no peripheral edema  GI: Soft, nontender, non distended, normal bowel sounds  SKIN: Dry, intact, No rashes or lesions  PSYCH: Alert and oriented x 3, normal affect, normal mood  CUSHING'S SIGNS: no striae    POCT Blood Glucose.: 339 mg/dL (08-20-23 @ 13:12)  POCT Blood Glucose.: 212 mg/dL (08-20-23 @ 09:09)  POCT Blood Glucose.: 239 mg/dL (08-19-23 @ 23:05)  POCT Blood Glucose.: 259 mg/dL (08-19-23 @ 07:10)  POCT Blood Glucose.: 319 mg/dL (08-18-23 @ 23:09)                            12.6   12.90 )-----------( 185      ( 20 Aug 2023 08:55 )             36.0       08-20    132<L>  |  97<L>  |  7   ----------------------------<  225<H>  3.7   |  23  |  0.30<L>    eGFR: 127    Ca    8.9      08-20  Mg     1.80     08-20  Phos  2.2     08-20    TPro  7.4  /  Alb  4.3  /  TBili  0.7  /  DBili  x   /  AST  26  /  ALT  44<H>  /  AlkPhos  116  08-19

## 2023-08-20 NOTE — CONSULT NOTE ADULT - ASSESSMENT
53-year-old female with longstanding type 2 diabetes poorly controlled on only intermittent metformin.  Patient advised of need for long-term diabetes control and irregular follow-up care.  Now admitted for abdominal pain being evaluated.  Recommend moderate dose basal bolus insulin although patient will probably be candidate for either oral therapy or GLP-1 therapy as outpatient.  Recommend increasing Lantus to 16 units/day.  Please add Premeal lispro 5 units 3 times a day premeals.  Dose to be tried titrated 1 to 2 days.    Physical exam notable for small nodular goiter.  Patient added history that she was seen in the past for thyroid nodule and had a biopsy in the distant past.  Clinically euthyroid.  Please repeat thyroid function test including TSH and free T4.  Patient appears clinically euthyroid on physical examination.

## 2023-08-20 NOTE — PROGRESS NOTE ADULT - PROBLEM SELECTOR PLAN 5
Hx of colon mass excision in 2021,reportedly benign , no chemotherapy was administered, however pt not certain about the diagnosis;     -Colonoscopy inpt vs f/u with outpt GI regarding surveillance and continuity of care

## 2023-08-20 NOTE — PROGRESS NOTE ADULT - ASSESSMENT
Chloe Treviño is a 53 y.o. woman with history of DM, uterine fibroids, sigmoidectomy (~2000, unclear if pathology was malignant), and chronic constipation who presented to the ED on 8/19 complaining of 1 week of intermittent lower abdominal pain. CT demonstrates a patulous, but patent and viable, rectosigmoid anastomosis consistent with surgical history. Significant stool burden in colon consistent with history of chronic constipation.    PLAN:  - No acute surgical intervention indicated  - Some component of the pain may be due to stool in colon; would benefit from bowel regimen including both laxative and stool softener. Suppository may also help.  - Pain may also be related to uterine fibroid disease  - Patient reports no recent colonoscopy; given age and history should follow up outpatient for colonoscopy or return to her surgeon at [facility] Chloe Treviño is a 53 y.o. woman with history of DM, uterine fibroids, sigmoidectomy (~2000, unclear if pathology was malignant), and chronic constipation who presented to the ED on 8/19 complaining of 1 week of intermittent lower abdominal pain. CT demonstrates a patulous, but patent and viable, rectosigmoid anastomosis consistent with surgical history. Significant stool burden in colon consistent with history of chronic constipation.    PLAN:  - No acute surgical intervention indicated  - F/u GYN recs, pain may also be related to uterine fibroid disease  - Some component of the pain may be due to stool in colon; would benefit from bowel regimen including both laxative and stool softener. Suppository may also help.  - Patient reports no recent colonoscopy; given age and history should follow up outpatient for colonoscopy or return to her surgeon at [facility]

## 2023-08-20 NOTE — PROGRESS NOTE ADULT - PROBLEM SELECTOR PLAN 1
Febrile, Qxmk=028.2, Tachycardia, HR= 100s-90s  Likely GI source as below; No respiratory symptoms, no CXR indicated;  -VS q4h  -NS IV  -Monitor Lactate, VBG in AM  -BCx pending   -UCx pending   -Started empiric Zosyn IV

## 2023-08-20 NOTE — PROGRESS NOTE ADULT - SUBJECTIVE AND OBJECTIVE BOX
Patient is a 53y old  Female who presents with a chief complaint of Pelvic pain radiating to buttock (20 Aug 2023 08:21)      SUBJECTIVE / OVERNIGHT EVENTS: Pt feeling somewhat better today. States her abd pain is 20% decreased. She did have some lower abd and flank pressure when voiding this morning, but now a bit better. Pt states she is quite hungry, having not eaten since Friday afternoon.    MEDICATIONS  (STANDING):  acetaminophen     Tablet .. 650 milliGRAM(s) Oral every 8 hours  dextrose 5%. 1000 milliLiter(s) (100 mL/Hr) IV Continuous <Continuous>  dextrose 5%. 1000 milliLiter(s) (50 mL/Hr) IV Continuous <Continuous>  dextrose 50% Injectable 25 Gram(s) IV Push once  dextrose 50% Injectable 12.5 Gram(s) IV Push once  dextrose 50% Injectable 25 Gram(s) IV Push once  enoxaparin Injectable 40 milliGRAM(s) SubCutaneous every 24 hours  glucagon  Injectable 1 milliGRAM(s) IntraMuscular once  insulin glargine Injectable (LANTUS) 10 Unit(s) SubCutaneous every morning  insulin lispro (ADMELOG) corrective regimen sliding scale   SubCutaneous three times a day before meals  insulin lispro (ADMELOG) corrective regimen sliding scale   SubCutaneous at bedtime  piperacillin/tazobactam IVPB.- 3.375 Gram(s) IV Intermittent once  piperacillin/tazobactam IVPB.. 3.375 Gram(s) IV Intermittent every 8 hours  polyethylene glycol 3350 17 Gram(s) Oral every 12 hours  senna 2 Tablet(s) Oral at bedtime  sodium chloride 0.9%. 1000 milliLiter(s) (100 mL/Hr) IV Continuous <Continuous>    MEDICATIONS  (PRN):  dextrose Oral Gel 15 Gram(s) Oral once PRN Blood Glucose LESS THAN 70 milliGRAM(s)/deciliter  melatonin 3 milliGRAM(s) Oral at bedtime PRN Insomnia  morphine  - Injectable 2 milliGRAM(s) IV Push every 4 hours PRN Moderate Pain (4 - 6)  morphine  - Injectable 4 milliGRAM(s) IV Push every 6 hours PRN Severe Pain (7 - 10)  ondansetron Injectable 4 milliGRAM(s) IV Push every 8 hours PRN Nausea and/or Vomiting      CAPILLARY BLOOD GLUCOSE      POCT Blood Glucose.: 212 mg/dL (20 Aug 2023 09:09)  POCT Blood Glucose.: 239 mg/dL (19 Aug 2023 23:05)    I&O's Summary      PHYSICAL EXAM:  Vital Signs Last 24 Hrs  T(C): 36.8 (20 Aug 2023 06:30), Max: 38.4 (19 Aug 2023 23:23)  T(F): 98.2 (20 Aug 2023 06:30), Max: 101.2 (19 Aug 2023 23:23)  HR: 87 (20 Aug 2023 06:30) (72 - 102)  BP: 148/80 (20 Aug 2023 06:30) (133/67 - 155/93)  BP(mean): --  RR: 16 (20 Aug 2023 06:30) (16 - 17)  SpO2: 96% (20 Aug 2023 06:30) (95% - 100%)    Parameters below as of 20 Aug 2023 06:30  Patient On (Oxygen Delivery Method): room air      CONSTITUTIONAL: NAD, well-developed, well-groomed  EYES: PERRLA; conjunctiva and sclera clear  ENMT: Moist oral mucosa, no pharyngeal injection or exudates; normal dentition  NECK: Supple, no palpable masses; no thyromegaly  RESPIRATORY: Normal respiratory effort; lungs are clear to auscultation bilaterally  CARDIOVASCULAR: Regular rate and rhythm, normal S1 and S2, no murmur/rub/gallop; No lower extremity edema; Peripheral pulses are 2+ bilaterally  ABDOMEN: Nontender to palpation, normoactive bowel sounds, no rebound/guarding; No hepatosplenomegaly  MUSCULOSKELETAL:  Nno clubbing or cyanosis of digits; no joint swelling or tenderness to palpation  PSYCH: A+O to person, place, and time; affect appropriate  NEUROLOGY: CN 2-12 are intact and symmetric; no gross sensory deficits   SKIN: No rashes; no palpable lesions    LABS:                        12.6   12.90 )-----------( 185      ( 20 Aug 2023 08:55 )             36.0     08-20    132<L>  |  97<L>  |  7   ----------------------------<  225<H>  3.7   |  23  |  0.30<L>    Ca    8.9      20 Aug 2023 08:55  Phos  2.2     08-20  Mg     1.80     08-20    TPro  7.4  /  Alb  4.3  /  TBili  0.7  /  DBili  x   /  AST  26  /  ALT  44<H>  /  AlkPhos  116  08-19          Urinalysis Basic - ( 20 Aug 2023 08:55 )    Color: x / Appearance: x / SG: x / pH: x  Gluc: 225 mg/dL / Ketone: x  / Bili: x / Urobili: x   Blood: x / Protein: x / Nitrite: x   Leuk Esterase: x / RBC: x / WBC x   Sq Epi: x / Non Sq Epi: x / Bacteria: x          RADIOLOGY & ADDITIONAL TESTS:  Results Reviewed:   Imaging Personally Reviewed:  Electrocardiogram Personally Reviewed:    COORDINATION OF CARE:  Care Discussed with Consultants/Other Providers [Y/N]:  Prior or Outpatient Records Reviewed [Y/N]:

## 2023-08-20 NOTE — PROGRESS NOTE ADULT - PROBLEM SELECTOR PLAN 2
Potentially multifactorial due to degenerating myoma, and chronic constipation;    Abnormal CT A/P:  -Low attenuating right intramural abnormality suggesting a degenerating myoma measuring 4.8 x 5.7 x 3.8 cm in size.  -Sigmoid anastomosis identified with evidence suggestive of possible J-pouch should be correlated with patient's surgical history given the somewhat atypical anastomotic features    -Gyn consulted, pending formal recommendations   -Gen Sx consulted, bowel regimen including both laxative and stool softener; suppository;  -Primary team to consider GI c/s for colonoscopy inpatient    -Empiric Zosyn as above given sepsis of likely GI source   -NPO for now -- f/u recs from consulting services to see if pt can be advanced to clears

## 2023-08-20 NOTE — PROGRESS NOTE ADULT - PROBLEM SELECTOR PLAN 3
CT A/P: Kidneys and ureters within normal limits. No hydronephrosis. Urinary bladder within normal limits.  UA not c/w UTI    -f/u UCx   -On Empiric Zosyn IV as above

## 2023-08-20 NOTE — PROGRESS NOTE ADULT - SUBJECTIVE AND OBJECTIVE BOX
SURGERY PROGRESS NOTE    Pelvic and perineal pain      VISHAL AMANDA  |  [id number]      S: HIRA overnight. Pt seen and evaluated bedside this AM. Pt resting comfortably on exam. Pain well controlled.      O:   Vital Signs Last 24 Hrs  T(C): 36.8 (20 Aug 2023 06:30), Max: 38.4 (19 Aug 2023 23:23)  T(F): 98.2 (20 Aug 2023 06:30), Max: 101.2 (19 Aug 2023 23:23)  HR: 87 (20 Aug 2023 06:30) (72 - 102)  BP: 148/80 (20 Aug 2023 06:30) (131/89 - 155/93)  BP(mean): --  RR: 16 (20 Aug 2023 06:30) (16 - 18)  SpO2: 96% (20 Aug 2023 06:30) (95% - 100%)    Parameters below as of 20 Aug 2023 06:30  Patient On (Oxygen Delivery Method): room air        PHYSICAL EXAM:  GENERAL: NAD, well-groomed, well-developed  HEENT: NC/AT  CHEST/LUNG: Breathing even, unlabored  HEART: Regular rate and rhythm  ABDOMEN: Soft, mildly distended in LLQ and suprapubic region.   EXTREMITIES: good distal pulses b/l   NEURO:  No focal deficits                          13.7   10.60 )-----------( 209      ( 19 Aug 2023 03:00 )             40.4     08-19    135  |  98  |  10  ----------------------------<  298<H>  3.9   |  25  |  0.33<L>    Ca    9.7      19 Aug 2023 03:00    TPro  7.4  /  Alb  4.3  /  TBili  0.7  /  DBili  x   /  AST  26  /  ALT  44<H>  /  AlkPhos  116  08-19          IMAGING STUDIES:

## 2023-08-20 NOTE — ED ADULT NURSE REASSESSMENT NOTE - NS ED NURSE REASSESS COMMENT FT1
no change in condition report given to ESSU 2 nurse and moved to ESSU 2
patient tis awake and alert, resting in no distress. Awaiting ct results.
unable to obtain peripheral IV after several attempts by multiple nurses. MD aware, USIV to be placed.
BREAK RN: pt. remains A&Ox4, awake and resting. pt. offers no new complaints at this time. no acute distress noted. respirations even and unlabored. VS as noted. pt. taken to US at this time.

## 2023-08-21 DIAGNOSIS — Z29.9 ENCOUNTER FOR PROPHYLACTIC MEASURES, UNSPECIFIED: ICD-10-CM

## 2023-08-21 LAB
ANION GAP SERPL CALC-SCNC: 16 MMOL/L — HIGH (ref 7–14)
BUN SERPL-MCNC: 9 MG/DL — SIGNIFICANT CHANGE UP (ref 7–23)
CALCIUM SERPL-MCNC: 9.4 MG/DL — SIGNIFICANT CHANGE UP (ref 8.4–10.5)
CHLORIDE SERPL-SCNC: 97 MMOL/L — LOW (ref 98–107)
CO2 SERPL-SCNC: 23 MMOL/L — SIGNIFICANT CHANGE UP (ref 22–31)
CREAT SERPL-MCNC: 0.38 MG/DL — LOW (ref 0.5–1.3)
EGFR: 120 ML/MIN/1.73M2 — SIGNIFICANT CHANGE UP
GLUCOSE BLDC GLUCOMTR-MCNC: 205 MG/DL — HIGH (ref 70–99)
GLUCOSE BLDC GLUCOMTR-MCNC: 256 MG/DL — HIGH (ref 70–99)
GLUCOSE BLDC GLUCOMTR-MCNC: 257 MG/DL — HIGH (ref 70–99)
GLUCOSE BLDC GLUCOMTR-MCNC: 289 MG/DL — HIGH (ref 70–99)
GLUCOSE BLDC GLUCOMTR-MCNC: 314 MG/DL — HIGH (ref 70–99)
GLUCOSE SERPL-MCNC: 232 MG/DL — HIGH (ref 70–99)
HCT VFR BLD CALC: 39.1 % — SIGNIFICANT CHANGE UP (ref 34.5–45)
HGB BLD-MCNC: 13.4 G/DL — SIGNIFICANT CHANGE UP (ref 11.5–15.5)
MAGNESIUM SERPL-MCNC: 1.9 MG/DL — SIGNIFICANT CHANGE UP (ref 1.6–2.6)
MCHC RBC-ENTMCNC: 29.8 PG — SIGNIFICANT CHANGE UP (ref 27–34)
MCHC RBC-ENTMCNC: 34.3 GM/DL — SIGNIFICANT CHANGE UP (ref 32–36)
MCV RBC AUTO: 86.9 FL — SIGNIFICANT CHANGE UP (ref 80–100)
NRBC # BLD: 0 /100 WBCS — SIGNIFICANT CHANGE UP (ref 0–0)
NRBC # FLD: 0 K/UL — SIGNIFICANT CHANGE UP (ref 0–0)
PHOSPHATE SERPL-MCNC: 2.5 MG/DL — SIGNIFICANT CHANGE UP (ref 2.5–4.5)
PLATELET # BLD AUTO: 204 K/UL — SIGNIFICANT CHANGE UP (ref 150–400)
POTASSIUM SERPL-MCNC: 3.6 MMOL/L — SIGNIFICANT CHANGE UP (ref 3.5–5.3)
POTASSIUM SERPL-SCNC: 3.6 MMOL/L — SIGNIFICANT CHANGE UP (ref 3.5–5.3)
RBC # BLD: 4.5 M/UL — SIGNIFICANT CHANGE UP (ref 3.8–5.2)
RBC # FLD: 12 % — SIGNIFICANT CHANGE UP (ref 10.3–14.5)
SODIUM SERPL-SCNC: 136 MMOL/L — SIGNIFICANT CHANGE UP (ref 135–145)
WBC # BLD: 9.59 K/UL — SIGNIFICANT CHANGE UP (ref 3.8–10.5)
WBC # FLD AUTO: 9.59 K/UL — SIGNIFICANT CHANGE UP (ref 3.8–10.5)

## 2023-08-21 PROCEDURE — 99232 SBSQ HOSP IP/OBS MODERATE 35: CPT

## 2023-08-21 RX ADMIN — POLYETHYLENE GLYCOL 3350 17 GRAM(S): 17 POWDER, FOR SOLUTION ORAL at 12:58

## 2023-08-21 RX ADMIN — POLYETHYLENE GLYCOL 3350 17 GRAM(S): 17 POWDER, FOR SOLUTION ORAL at 18:07

## 2023-08-21 RX ADMIN — ENOXAPARIN SODIUM 40 MILLIGRAM(S): 100 INJECTION SUBCUTANEOUS at 08:56

## 2023-08-21 RX ADMIN — INSULIN GLARGINE 16 UNIT(S): 100 INJECTION, SOLUTION SUBCUTANEOUS at 10:05

## 2023-08-21 RX ADMIN — PIPERACILLIN AND TAZOBACTAM 25 GRAM(S): 4; .5 INJECTION, POWDER, LYOPHILIZED, FOR SOLUTION INTRAVENOUS at 18:07

## 2023-08-21 RX ADMIN — Medication 5 UNIT(S): at 08:56

## 2023-08-21 RX ADMIN — Medication 3: at 12:58

## 2023-08-21 RX ADMIN — Medication 5 UNIT(S): at 18:24

## 2023-08-21 RX ADMIN — Medication 650 MILLIGRAM(S): at 06:54

## 2023-08-21 RX ADMIN — Medication 5 UNIT(S): at 12:58

## 2023-08-21 RX ADMIN — PIPERACILLIN AND TAZOBACTAM 25 GRAM(S): 4; .5 INJECTION, POWDER, LYOPHILIZED, FOR SOLUTION INTRAVENOUS at 08:56

## 2023-08-21 RX ADMIN — Medication 3: at 18:24

## 2023-08-21 RX ADMIN — Medication 650 MILLIGRAM(S): at 05:54

## 2023-08-21 RX ADMIN — PIPERACILLIN AND TAZOBACTAM 25 GRAM(S): 4; .5 INJECTION, POWDER, LYOPHILIZED, FOR SOLUTION INTRAVENOUS at 01:57

## 2023-08-21 RX ADMIN — Medication 650 MILLIGRAM(S): at 13:01

## 2023-08-21 RX ADMIN — Medication 1: at 21:35

## 2023-08-21 RX ADMIN — Medication 650 MILLIGRAM(S): at 14:01

## 2023-08-21 RX ADMIN — Medication 2: at 08:56

## 2023-08-21 NOTE — PATIENT PROFILE ADULT - FALL HARM RISK - HARM RISK INTERVENTIONS
Assistance with ambulation/Assistance OOB with selected safe patient handling equipment/Communicate Risk of Fall with Harm to all staff/Monitor for mental status changes/Monitor gait and stability/Reinforce activity limits and safety measures with patient and family/Reorient to person, place and time as needed/Review medications for side effects contributing to fall risk/Sit up slowly, dangle for a short time, stand at bedside before walking/Tailored Fall Risk Interventions/Toileting schedule using arm’s reach rule for commode and bathroom/Use of alarms - bed, chair and/or voice tab/Visual Cue: Yellow wristband and red socks/Bed in lowest position, wheels locked, appropriate side rails in place/Call bell, personal items and telephone in reach/Instruct patient to call for assistance before getting out of bed or chair/Non-slip footwear when patient is out of bed/[place] to call system/Physically safe environment - no spills, clutter or unnecessary equipment/Purposeful Proactive Rounding/Room/bathroom lighting operational, light cord in reach

## 2023-08-21 NOTE — PROGRESS NOTE ADULT - ASSESSMENT
Chloe Treviño is a 53 y.o. woman with history of DM, uterine fibroids, sigmoidectomy (~2000, unclear if pathology was malignant), and chronic constipation who presented to the ED on 8/19 complaining of 1 week of intermittent lower abdominal pain. CT demonstrates a patulous, but patent and viable, rectosigmoid anastomosis consistent with surgical history. Significant stool burden in colon consistent with history of chronic constipation.    PLAN:  - No acute surgical intervention indicated  - F/u GYN recs, pain may also be related to uterine fibroid disease  - Some component of the pain may be due to stool in colon; would benefit from bowel regimen including both laxative and stool softener- patient is now passing gas, having BMs.   - Patient states she had a colonoscopy last year, recommend outpatient follow up with GI/ Colorectal Surgeon   - Surgery to sign off at this time.     [facility] Surgery, [id number]

## 2023-08-21 NOTE — PROGRESS NOTE ADULT - PROBLEM SELECTOR PLAN 5
Hx of colon mass excision in 2021,reportedly benign , no chemotherapy was administered, however pt not certain about the diagnosis;     -Colonoscopy inpt vs f/u with outpt GI regarding surveillance and continuity of care Hx of colon mass excision in 2021,reportedly benign. Pt reports last colonoscopy 1 year prior, reportedly normal.  - Per colorectal surg, pt can follow up with GI or colorectal surgery outpatient

## 2023-08-21 NOTE — PROGRESS NOTE ADULT - SUBJECTIVE AND OBJECTIVE BOX
note incomplete Progress Note    08-19-23 (2d)    Subjective / Overnight Events :  - No acute events overnight.  - Pt seen and examined at bedside. Pt reports symptomatic improvement since admission. She reports that her dysuria has resolved. Her pelvic and rectal pain are mild this morning and she reports a normal bowel movement last night. Tolerating PO intake. No subjective fevers or chills. No chest pain, shortness of breath, or weakness. ROS otherwise negative.    MEDICATIONS  (STANDING):  acetaminophen Tablet 650 milliGRAM(s) Oral every 8 hours  dextrose 5%. 1000 milliLiter(s) (100 mL/Hr) IV Continuous <Continuous>  dextrose 5%. 1000 milliLiter(s) (50 mL/Hr) IV Continuous <Continuous>  dextrose 50% Injectable 25 Gram(s) IV Push once  dextrose 50% Injectable 12.5 Gram(s) IV Push once  dextrose 50% Injectable 25 Gram(s) IV Push once  enoxaparin Injectable 40 milliGRAM(s) SubCutaneous every 24 hours  glucagon  Injectable 1 milliGRAM(s) IntraMuscular once  insulin glargine Injectable (LANTUS) 16 Unit(s) SubCutaneous every morning  insulin lispro (ADMELOG) corrective regimen sliding scale   SubCutaneous at bedtime  insulin lispro (ADMELOG) corrective regimen sliding scale   SubCutaneous three times a day before meals  insulin lispro Injectable (ADMELOG) 5 Unit(s) SubCutaneous three times a day before meals  piperacillin/tazobactam IVPB.. 3.375 Gram(s) IV Intermittent every 8 hours  polyethylene glycol 3350 17 Gram(s) Oral every 12 hours  senna 2 Tablet(s) Oral at bedtime  sodium chloride 0.9%. 1000 milliLiter(s) (100 mL/Hr) IV Continuous <Continuous>    MEDICATIONS  (PRN):  dextrose Oral Gel 15 Gram(s) Oral once PRN Blood Glucose LESS THAN 70 milliGRAM(s)/deciliter  melatonin 3 milliGRAM(s) Oral at bedtime PRN Insomnia  morphine  - Injectable 4 milliGRAM(s) IV Push every 6 hours PRN Severe Pain (7 - 10)  morphine  - Injectable 2 milliGRAM(s) IV Push every 4 hours PRN Moderate Pain (4 - 6)  ondansetron Injectable 4 milliGRAM(s) IV Push every 8 hours PRN Nausea and/or Vomiting      CAPILLARY BLOOD GLUCOSE  POCT Blood Glucose.: 314 mg/dL (21 Aug 2023 09:56)  POCT Blood Glucose.: 205 mg/dL (21 Aug 2023 08:30)  POCT Blood Glucose.: 270 mg/dL (20 Aug 2023 22:24)  POCT Blood Glucose.: 252 mg/dL (20 Aug 2023 17:24)  POCT Blood Glucose.: 339 mg/dL (20 Aug 2023 13:12)    PHYSICAL EXAM:  Vital Signs Last 24 Hrs  T(C): 36.7 (21 Aug 2023 05:45), Max: 38.1 (20 Aug 2023 15:06)  T(F): 98 (21 Aug 2023 05:45), Max: 100.6 (20 Aug 2023 15:06)  HR: 90 (21 Aug 2023 05:45) (86 - 95)  BP: 139/85 (21 Aug 2023 05:45) (119/75 - 144/88)  RR: 17 (21 Aug 2023 05:45) (17 - 19)  SpO2: 98% (21 Aug 2023 05:45) (96% - 100%)  Parameters below as of 21 Aug 2023 05:45  Patient On (Oxygen Delivery Method): room air    General: NAD, non-toxic appearing   HEENT: no scleral icterus  CV: RRR, normal S1 and S2  Lungs: normal respiratory effort. CTAB  Abd: soft, mildly ttp in suprapubic area/LLQ, nondistended  Ext: no edema, 2+ peripheral pulses. +darkened skin on dorsal aspect of feet bilaterally  Psych: AAOx3, appropriate affect   Neuro: grossly non-focal, moving all extremities spontaneously   Skin: no rashes or lesions     LABS:                          13.4   9.59  )-----------( 204      ( 21 Aug 2023 06:35 )             39.1       WBC Trend: 9.59<--, 12.90<--, 10.60<--  Hb Trend: 13.4<--, 12.6<--, 13.7<--    08-21    136  |  97<L>  |  9   ----------------------------<  232<H>  3.6   |  23  |  0.38<L>    Ca    9.4      21 Aug 2023 06:35  Phos  2.5     08-21  Mg     1.90     08-21    Urinalysis Basic - ( 21 Aug 2023 06:35 )    Color: x / Appearance: x / SG: x / pH: x  Gluc: 232 mg/dL / Ketone: x  / Bili: x / Urobili: x   Blood: x / Protein: x / Nitrite: x   Leuk Esterase: x / RBC: x / WBC x   Sq Epi: x / Non Sq Epi: x / Bacteria: x        Culture - Blood (collected 20 Aug 2023 03:55)  Source: .Blood Blood-Venous  Preliminary Report (21 Aug 2023 11:03):    No growth at 24 hours    Culture - Blood (collected 20 Aug 2023 00:01)  Source: .Blood Blood-Peripheral  Preliminary Report (21 Aug 2023 11:02):    No growth at 24 hours    Culture - Urine (collected 19 Aug 2023 02:47)  Source: Clean Catch Clean Catch (Midstream)  Preliminary Report (21 Aug 2023 09:46):    10,000 - 49,000 CFU/mL Staphylococcus aureus    RADIOLOGY & ADDITIONAL TESTS:   < from: CT Abdomen and Pelvis w/ IV Cont (08.19.23 @ 09:24) >  IMPRESSION:  Low attenuating right intramural abnormality suggesting a degenerating   myoma measuring 4.8 x 5.7 x 3.8 cm in size.    Sigmoid anastomosis identified with evidence suggestive of possible   J-pouch should be correlated with patient's surgical history given the   somewhat atypical anastomotic features      < end of copied text >  < from: US Transvaginal (08.19.23 @ 18:44) >  FINDINGS:  Uterus: 10.1 x 6.4 x 7.5 cm. Enlarged. 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 IUD again noted.    Right ovary not visualized.  Tubular cystic appearing structure region left adnexa which may present   hydrosalpinx (also suggested on CT series 2 images )    Fluid: None.    IMPRESSION:  Enlarged uterus with lipoleiomyoma.  Questionable left hydrosalpinx.    < end of copied text >

## 2023-08-21 NOTE — PROGRESS NOTE ADULT - PROBLEM SELECTOR PLAN 4
On Metformin; Was on Lantus in  the past; A1c 11.4 in 2020;  BG= 200s-300s    -Hold Metformin  -ISS  -Hold starting Lantus, awaiting A1c   -NPO for now, hypoglycemia protocol    Addendum, 2:50am, A1c=12.2  -Start conservative dose of Lantus 10 units in AM while NPO   -Formal Endocrinology c/s in AM, primary team Home medication: Metformin 500mg daily, holding in hospital. A1C = 12.2%  - Endo recs - 16u Lantus qAM + 5u pre-meal  - Endo rec for TSH/T4 Home medication: Metformin 500mg daily, holding in hospital. A1C = 12.2%  - Endo recs - 16u Lantus qAM + 5u pre-meal  - TSH/T4 ordered per rec  - Will need outpt followup for management of diabetes

## 2023-08-21 NOTE — PROGRESS NOTE ADULT - ASSESSMENT
52 y/o Female with MHx of DM Type 2, fibroids, h/o colon mass excision in 2021  a/w sepsis of likely GI source i/s/o severe lower Abd pain of multifactorial etiology; Evaluated by Gen Sx and Gyn;  VISHAL AMANDA is a 52YO female with PMH of T2DM, uterine fibroids, colon mass s/p sigmoidectomy (2021) presenting with abdominal and rectal pain, now admitted for management of sepsis with suspected GI/ source.  VISHAL AMANDA is a 52YO female with PMH of T2DM, uterine fibroids, colon mass s/p sigmoidectomy (2021) presenting with abdominal and rectal pain, now admitted for management of sepsis with suspected GI/ source with degenerating fibroid on imaging.

## 2023-08-21 NOTE — PROGRESS NOTE ADULT - PROBLEM SELECTOR PLAN 1
Febrile, Xnem=358.2, Tachycardia, HR= 100s-90s  Likely GI source as below; No respiratory symptoms, no CXR indicated;  -VS q4h  -NS IV  -Monitor Lactate, VBG in AM  -BCx pending   -UCx pending   -Started empiric Zosyn IV Mzyq=679.2, Tachycardia, HR= 100s-90s. VSS today. Likely GI or  source.  -VS q4h  -Lactate = 1 on admission  -F/u blood cultures  -F/u urine cultures - prelim with 10-49K gram+  -C/w Zosyn 3.357g q8 On admission - Qzpw=252.2, Tachycardia, HR= 100s-90s. VSS today. Likely GI or  source.  -VS q4h  -Lactate = 1 on admission  -F/u blood cultures  -F/u urine cultures - prelim with 10-49K gram+  -C/w Zosyn 3.357g q8

## 2023-08-21 NOTE — PROGRESS NOTE ADULT - ATTENDING COMMENTS
Date of service: 8/21/23    Agree with E&M as outlined above.
53 year old female with PMH of T2DM, uterine fibroids, colon mass s/p sigmoidectomy (2021) presenting with abdominal and rectal pain, now admitted for management of pain, degenerating fibroid on imaging. Pt seen by gyn and colorectal team- no plan for intervention. Patient states she had BM yesterday and feels improved. On admission met SIRS criteria, f/up blood cultures. If continues to improve, afebrile > 48hrs and infection workup negative , possible DC tomorrow.    Rest of the plan as above. Discussed with HS.

## 2023-08-21 NOTE — PROGRESS NOTE ADULT - PROBLEM SELECTOR PLAN 2
Potentially multifactorial due to degenerating myoma, and chronic constipation;    Abnormal CT A/P:  -Low attenuating right intramural abnormality suggesting a degenerating myoma measuring 4.8 x 5.7 x 3.8 cm in size.  -Sigmoid anastomosis identified with evidence suggestive of possible J-pouch should be correlated with patient's surgical history given the somewhat atypical anastomotic features    -Gyn consulted, pending formal recommendations   -Gen Sx consulted, bowel regimen including both laxative and stool softener; suppository;  -Primary team to consider GI c/s for colonoscopy inpatient    -Empiric Zosyn as above given sepsis of likely GI source   -NPO for now -- f/u recs from consulting services to see if pt can be advanced to clears Multifactorial - degenerating fibroid, chronic constipation   - CTAP: "Low attenuating right intramural abnormality suggesting a degenerating myoma measuring 4.8 x 5.7 x 3.8 cm in size. Sigmoid anastomosis identified with evidence suggestive of possible J-pouch should be correlated with patient's surgical history given the somewhat atypical anastomotic features"  - OB/Gyn consult - no acute surgical intervention  - Gen Surg consulted - bowel regimen including both laxative and stool softener  - Pain regimen - Tylenol for mild, Morphine 2mg IV for moderate, Morphine 4mg IV for severe  - Outpatient GI f/u

## 2023-08-21 NOTE — PROGRESS NOTE ADULT - SUBJECTIVE AND OBJECTIVE BOX
Surgery Progress Note    SUBJECTIVE: Pt seen and examined at bedside. Patient comfortable and in no-apparent distress. No nausea, vomiting. Pt passing gas, having BMs. Pain is controlled.     Vital Signs Last 24 Hrs  T(C): 36.7 (21 Aug 2023 05:45), Max: 38.1 (20 Aug 2023 15:06)  T(F): 98 (21 Aug 2023 05:45), Max: 100.6 (20 Aug 2023 15:06)  HR: 90 (21 Aug 2023 05:45) (86 - 95)  BP: 139/85 (21 Aug 2023 05:45) (119/75 - 144/88)  BP(mean): --  RR: 17 (21 Aug 2023 05:45) (17 - 19)  SpO2: 98% (21 Aug 2023 05:45) (96% - 100%)    Parameters below as of 21 Aug 2023 05:45  Patient On (Oxygen Delivery Method): room air    Physical Exam:  General Appearance: Appears well, NAD  Respiratory: No labored breathing  CV: Pulse regularly present  Abdomen: Soft, nontender, nondistended     LABS:                        12.6   12.90 )-----------( 185      ( 20 Aug 2023 08:55 )             36.0     08-20    132<L>  |  97<L>  |  7   ----------------------------<  225<H>  3.7   |  23  |  0.30<L>    Ca    8.9      20 Aug 2023 08:55  Phos  2.2     08-20  Mg     1.80     08-20        Urinalysis Basic - ( 20 Aug 2023 08:55 )    Color: x / Appearance: x / SG: x / pH: x  Gluc: 225 mg/dL / Ketone: x  / Bili: x / Urobili: x   Blood: x / Protein: x / Nitrite: x   Leuk Esterase: x / RBC: x / WBC x   Sq Epi: x / Non Sq Epi: x / Bacteria: x

## 2023-08-21 NOTE — PROGRESS NOTE ADULT - PROBLEM SELECTOR PLAN 6
Lovenox sq DVT prophylaxis - Lovenox  Diet - consistent carbohydrate  Dispo - home pending hospital course

## 2023-08-21 NOTE — PROGRESS NOTE ADULT - PROBLEM SELECTOR PLAN 3
CT A/P: Kidneys and ureters within normal limits. No hydronephrosis. Urinary bladder within normal limits.  UA not c/w UTI    -f/u UCx   -On Empiric Zosyn IV as above Pt reporting dysuria on admission. Improving.   -f/u final urine culture  -Zosyn as above Pt reporting dysuria on admission. Improving.   - f/u final urine culture  -Zosyn as above

## 2023-08-22 ENCOUNTER — TRANSCRIPTION ENCOUNTER (OUTPATIENT)
Age: 53
End: 2023-08-22

## 2023-08-22 VITALS
DIASTOLIC BLOOD PRESSURE: 95 MMHG | TEMPERATURE: 98 F | HEART RATE: 79 BPM | OXYGEN SATURATION: 98 % | SYSTOLIC BLOOD PRESSURE: 134 MMHG | RESPIRATION RATE: 17 BRPM

## 2023-08-22 DIAGNOSIS — I10 ESSENTIAL (PRIMARY) HYPERTENSION: ICD-10-CM

## 2023-08-22 DIAGNOSIS — E78.5 HYPERLIPIDEMIA, UNSPECIFIED: ICD-10-CM

## 2023-08-22 LAB
-  AMPICILLIN/SULBACTAM: SIGNIFICANT CHANGE UP
-  CEFAZOLIN: SIGNIFICANT CHANGE UP
-  DAPTOMYCIN: SIGNIFICANT CHANGE UP
-  GENTAMICIN: SIGNIFICANT CHANGE UP
-  LINEZOLID: SIGNIFICANT CHANGE UP
-  OXACILLIN: SIGNIFICANT CHANGE UP
-  PENICILLIN: SIGNIFICANT CHANGE UP
-  RIFAMPIN: SIGNIFICANT CHANGE UP
-  TETRACYCLINE: SIGNIFICANT CHANGE UP
-  TRIMETHOPRIM/SULFAMETHOXAZOLE: SIGNIFICANT CHANGE UP
-  VANCOMYCIN: SIGNIFICANT CHANGE UP
ALBUMIN SERPL ELPH-MCNC: 3.5 G/DL — SIGNIFICANT CHANGE UP (ref 3.3–5)
ALP SERPL-CCNC: 76 U/L — SIGNIFICANT CHANGE UP (ref 40–120)
ALT FLD-CCNC: 37 U/L — HIGH (ref 4–33)
ANION GAP SERPL CALC-SCNC: 10 MMOL/L — SIGNIFICANT CHANGE UP (ref 7–14)
AST SERPL-CCNC: 29 U/L — SIGNIFICANT CHANGE UP (ref 4–32)
BILIRUB SERPL-MCNC: 0.5 MG/DL — SIGNIFICANT CHANGE UP (ref 0.2–1.2)
BUN SERPL-MCNC: 9 MG/DL — SIGNIFICANT CHANGE UP (ref 7–23)
CALCIUM SERPL-MCNC: 9.5 MG/DL — SIGNIFICANT CHANGE UP (ref 8.4–10.5)
CHLORIDE SERPL-SCNC: 101 MMOL/L — SIGNIFICANT CHANGE UP (ref 98–107)
CO2 SERPL-SCNC: 26 MMOL/L — SIGNIFICANT CHANGE UP (ref 22–31)
CREAT SERPL-MCNC: 0.36 MG/DL — LOW (ref 0.5–1.3)
CULTURE RESULTS: SIGNIFICANT CHANGE UP
EGFR: 121 ML/MIN/1.73M2 — SIGNIFICANT CHANGE UP
GLUCOSE BLDC GLUCOMTR-MCNC: 227 MG/DL — HIGH (ref 70–99)
GLUCOSE BLDC GLUCOMTR-MCNC: 279 MG/DL — HIGH (ref 70–99)
GLUCOSE SERPL-MCNC: 215 MG/DL — HIGH (ref 70–99)
HCT VFR BLD CALC: 38.9 % — SIGNIFICANT CHANGE UP (ref 34.5–45)
HGB BLD-MCNC: 13.3 G/DL — SIGNIFICANT CHANGE UP (ref 11.5–15.5)
MCHC RBC-ENTMCNC: 29.4 PG — SIGNIFICANT CHANGE UP (ref 27–34)
MCHC RBC-ENTMCNC: 34.2 GM/DL — SIGNIFICANT CHANGE UP (ref 32–36)
MCV RBC AUTO: 85.9 FL — SIGNIFICANT CHANGE UP (ref 80–100)
METHOD TYPE: SIGNIFICANT CHANGE UP
NRBC # BLD: 0 /100 WBCS — SIGNIFICANT CHANGE UP (ref 0–0)
NRBC # FLD: 0 K/UL — SIGNIFICANT CHANGE UP (ref 0–0)
ORGANISM # SPEC MICROSCOPIC CNT: SIGNIFICANT CHANGE UP
ORGANISM # SPEC MICROSCOPIC CNT: SIGNIFICANT CHANGE UP
PLATELET # BLD AUTO: 236 K/UL — SIGNIFICANT CHANGE UP (ref 150–400)
POTASSIUM SERPL-MCNC: 3.5 MMOL/L — SIGNIFICANT CHANGE UP (ref 3.5–5.3)
POTASSIUM SERPL-SCNC: 3.5 MMOL/L — SIGNIFICANT CHANGE UP (ref 3.5–5.3)
PROT SERPL-MCNC: 7.5 G/DL — SIGNIFICANT CHANGE UP (ref 6–8.3)
RBC # BLD: 4.53 M/UL — SIGNIFICANT CHANGE UP (ref 3.8–5.2)
RBC # FLD: 12 % — SIGNIFICANT CHANGE UP (ref 10.3–14.5)
SODIUM SERPL-SCNC: 137 MMOL/L — SIGNIFICANT CHANGE UP (ref 135–145)
SPECIMEN SOURCE: SIGNIFICANT CHANGE UP
T4 FREE+ TSH PNL SERPL: 0.47 UIU/ML — SIGNIFICANT CHANGE UP (ref 0.27–4.2)
WBC # BLD: 7.64 K/UL — SIGNIFICANT CHANGE UP (ref 3.8–10.5)
WBC # FLD AUTO: 7.64 K/UL — SIGNIFICANT CHANGE UP (ref 3.8–10.5)

## 2023-08-22 PROCEDURE — 99239 HOSP IP/OBS DSCHRG MGMT >30: CPT

## 2023-08-22 PROCEDURE — 99232 SBSQ HOSP IP/OBS MODERATE 35: CPT

## 2023-08-22 RX ORDER — METFORMIN HYDROCHLORIDE 850 MG/1
1 TABLET ORAL
Qty: 60 | Refills: 3
Start: 2023-08-22

## 2023-08-22 RX ORDER — METFORMIN HYDROCHLORIDE 850 MG/1
1 TABLET ORAL
Refills: 0 | DISCHARGE

## 2023-08-22 RX ORDER — ISOPROPYL ALCOHOL, BENZOCAINE .7; .06 ML/ML; ML/ML
0 SWAB TOPICAL
Qty: 100 | Refills: 1
Start: 2023-08-22

## 2023-08-22 RX ORDER — INSULIN GLARGINE 100 [IU]/ML
20 INJECTION, SOLUTION SUBCUTANEOUS EVERY MORNING
Refills: 0 | Status: DISCONTINUED | OUTPATIENT
Start: 2023-08-23 | End: 2023-08-22

## 2023-08-22 RX ORDER — INSULIN GLARGINE 100 [IU]/ML
20 INJECTION, SOLUTION SUBCUTANEOUS
Qty: 3 | Refills: 1
Start: 2023-08-22

## 2023-08-22 RX ORDER — INSULIN LISPRO 100/ML
6 VIAL (ML) SUBCUTANEOUS
Refills: 0 | Status: DISCONTINUED | OUTPATIENT
Start: 2023-08-22 | End: 2023-08-22

## 2023-08-22 RX ORDER — MUPIROCIN 20 MG/G
1 OINTMENT TOPICAL
Qty: 2 | Refills: 0
Start: 2023-08-22

## 2023-08-22 RX ORDER — METFORMIN HYDROCHLORIDE 850 MG/1
1 TABLET ORAL
Qty: 30 | Refills: 3
Start: 2023-08-22

## 2023-08-22 RX ADMIN — Medication 2: at 09:00

## 2023-08-22 RX ADMIN — Medication 6 UNIT(S): at 12:46

## 2023-08-22 RX ADMIN — Medication 5 UNIT(S): at 08:59

## 2023-08-22 RX ADMIN — INSULIN GLARGINE 16 UNIT(S): 100 INJECTION, SOLUTION SUBCUTANEOUS at 08:59

## 2023-08-22 RX ADMIN — PIPERACILLIN AND TAZOBACTAM 25 GRAM(S): 4; .5 INJECTION, POWDER, LYOPHILIZED, FOR SOLUTION INTRAVENOUS at 02:25

## 2023-08-22 RX ADMIN — Medication 3: at 12:47

## 2023-08-22 RX ADMIN — PIPERACILLIN AND TAZOBACTAM 25 GRAM(S): 4; .5 INJECTION, POWDER, LYOPHILIZED, FOR SOLUTION INTRAVENOUS at 08:59

## 2023-08-22 NOTE — PROGRESS NOTE ADULT - PROBLEM SELECTOR PROBLEM 4
Type 2 diabetes mellitus with hyperglycemia

## 2023-08-22 NOTE — DISCHARGE NOTE NURSING/CASE MANAGEMENT/SOCIAL WORK - NSDCPEFALRISK_GEN_ALL_CORE
For information on Fall & Injury Prevention, visit: [email or web address] OR  [email or web address] OR  https://www.cdc.gov/steadi/patient.html

## 2023-08-22 NOTE — PROGRESS NOTE ADULT - PROBLEM SELECTOR PLAN 2
Multifactorial - degenerating fibroid, chronic constipation   - CTAP: "Low attenuating right intramural abnormality suggesting a degenerating myoma measuring 4.8 x 5.7 x 3.8 cm in size. Sigmoid anastomosis identified with evidence suggestive of possible J-pouch should be correlated with patient's surgical history given the somewhat atypical anastomotic features"  - OB/Gyn consult - no acute surgical intervention  - Gen Surg consulted - bowel regimen including both laxative and stool softener  - Pain regimen - Tylenol for mild, Morphine 2mg IV for moderate, Morphine 4mg IV for severe  - Outpatient GI f/u Multifactorial - degenerating fibroid, chronic constipation   - CTAP: "Low attenuating right intramural abnormality suggesting a degenerating myoma measuring 4.8 x 5.7 x 3.8 cm in size. Sigmoid anastomosis identified with evidence suggestive of possible J-pouch should be correlated with patient's surgical history given the somewhat atypical anastomotic features"  - OB/Gyn consult - no acute surgical intervention  - Gen Surg consulted - bowel regimen including both laxative and stool softener  - Pain regimen - Tylenol for mild, Morphine 2mg IV for moderate, Morphine 4mg IV for severe  - Outpatient GI/colorectal f/u

## 2023-08-22 NOTE — PROGRESS NOTE ADULT - PROBLEM SELECTOR PLAN 3
Pt reporting dysuria on admission. Improving.   - f/u final urine culture  -Zosyn as above Pt reporting dysuria on admission. Improved.  - f/u final urine culture  -Zosyn as above

## 2023-08-22 NOTE — PROGRESS NOTE ADULT - ASSESSMENT
52 y/o Female with MHx of DM Type 2, fibroids, h/o colon mass excision in 2021 (reportedly benign , no chemotherapy was dministered) c/o severe non-radiating lower abdominal pain and rectal pain x 1 week. Also endorsing painful urination.  Found to have a1c 12.2%  Endocrine team consulted for uncontrolled diabetes. Patient is high risk with high level decision making due to uncontrolled diabetes which places patient at high risk for cardiovascular and cerebrovascular events. Patient with lability of glucose requiring close monitoring and insulin adjustments.    Uncontrolled type 2 DM  target a1c < 7%, patient a1c 12%  Home medication: metformin- taken intermittently  INPATIENT PLAN:   Increase Lantus to 20 units sq q AM  Increase Admelog to 6 units sq tid ac- hold for npo / skips meals  cotninue low dose admelog correction scales for ac and for hs  RD consult, if not done already  Discussed risk for micro/macrovascular complications with prolonged elevated a1c and prolonged hyperglycemia  Discussed target a1c < 7%  Bedside RN- to teach/review how to administer insulin with insulin pen.    Reviewed with patient hypoglycemia management and importance of rotating injection sited  DISCHARGE PLAN:  Please send Lantus solostar pens 20 units sq qhs test script to check insurance coverage.  Ok to send with current dose  If Lantus not covered- can try alternating with one of following   tresiba/basaglar/toujeo/Levemir/Semglee  Resume metformin at 1000 mg po bid  Ensure patient has working glucometer, test strips, lancets, alcohol pads, and BD tamica pen needles  Please also prescribe glucose tabs or glucagon emergency kit for hypoglycemia risk   Patient should follow up with Endocrinologist.  Will have office call patient to schedule an appointment in [place]- With Dr. Lucas Khan    HTN  patient may benefit from ACEi or ARB for kidney protection  target BP < 130 / 80.  Patient close to target  Check urine microalb/cr ratio outpatient    HLD  ldl target < 70  Would recommend patient to get lipid panel outpatient, may benefit from statin    Ciara Caraballo  Nurse Practitioner  Division of Endocrinology & Diabetes  Available via  Teams      If after 6PM or before 9AM, or on weekends/holidays, please call endocrine answering service for assistance ([phone]).  For nonurgent matters email [email or web address] for assistance.

## 2023-08-22 NOTE — DISCHARGE NOTE PROVIDER - NSFOLLOWUPCLINICS_GEN_ALL_ED_FT
[facility] Endocrinology  Endocrinology  [address] [place] [address]  Phone: [phone]  Fax:     [facility] Gynecology and Obstetrics  Gynceology/OB  [address] [place] [address]  Phone: [phone]  Fax:

## 2023-08-22 NOTE — DISCHARGE NOTE PROVIDER - PROVIDER TOKENS
PROVIDER:[TOKEN:[[id number]],FOLLOWUP:[2 weeks]],FREE:[LAST:[Ismailoff],FIRST:[Ethel],PHONE:[phone]],FAX:[(   )    -],FOLLOWUP:[1 week]]

## 2023-08-22 NOTE — DIETITIAN INITIAL EVALUATION ADULT - PROBLEM SELECTOR PLAN 1
Febrile, Tjoe=673.2, Tachycardia, HR= 100s-90s  Likely GI source as below; No respiratory symptoms, no CXR indicated;  -VS q4h  -NS IV  -Monitor Lactate, VBG in AM  -BCx pending   -UCx pending   -Started empiric Zosyn IV

## 2023-08-22 NOTE — DISCHARGE NOTE PROVIDER - NSDCCPTREATMENT_GEN_ALL_CORE_FT
PRINCIPAL PROCEDURE  Procedure: CT abdomen and pelvis wo then w contrast  Findings and Treatment: IMPRESSION:  Low attenuating right intramural abnormality suggesting a degenerating   myoma measuring 4.8 x 5.7 x 3.8 cm in size.  Sigmoid anastomosis identified with evidence suggestive of possible   J-pouch should be correlated with patient's surgical history given the   somewhat atypical anastomotic features

## 2023-08-22 NOTE — DISCHARGE NOTE PROVIDER - HOSPITAL COURSE
VISHAL AMANDA is a 52YO female with PMH of T2DM, uterine fibroids, colon mass s/p sigmoidectomy (2021) who presented to the ED with abdominal and rectal pain. In the ED, she met SIRS criteria with fever, tachycardia, and an elevated WBC count. A CTAP showed a low attenuating right intramural abnormality suggestive of degenerating myoma measuring 4.8 x 5.7 x 3.8cm, as well as sigmoid anastomosis w/ possible J-pouch. 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 The patient was evaluated by Ob/Gyn, General Surgery, and Colorectal surgery.    She was treated with Zosyn with symptomatic and clinical improvement. She was afebrile for ___ hours. [] blood cultures    []goiter TSH/T4    While admitted to the hospital, the patient had persistently elevated blood glucose readings. Her Hemoglobin A1C was measured at 12.2% and she was evaluated by the inpatient endocrinology service. She received 16 units of Lantus in the mornings and 5u short-acting insulin TID for pre-meals. She should follow up with an outpatient endocrinologist for further management of T2DM.      TVUS:  Enlarged uterus with lipoleiomyoma.  Questionable left hydrosalpinx.      CTAP:  Low attenuating right intramural abnormality suggesting a degenerating   myoma measuring 4.8 x 5.7 x 3.8 cm in size.  Sigmoid anastomosis identified with evidence suggestive of possible   J-pouch should be correlated with patient's surgical history given the   somewhat atypical anastomotic features         VISHAL AMANDA is a 54YO female with PMH of T2DM, uterine fibroids, colon mass s/p sigmoidectomy (2021) who presented to the ED with abdominal and rectal pain. In the ED, she met SIRS criteria with fever, tachycardia, and an elevated WBC count. A CTAP showed a low attenuating right intramural abnormality suggestive of degenerating myoma measuring 4.8 x 5.7 x 3.8cm, as well as sigmoid anastomosis w/ possible J-pouch. 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     The patient was evaluated by Ob/Gyn, who did not recommend acute surgical intervention for the degenerating fibroid. She can take NSAIDs as needed for pain and should follow up with Gyn outpatient.  The patient was evaluated by General Surgery, who recommended a bowel regimen due to stool burden in the colon on imaging. The patient received senna and Miralax. She should follow up outpatient with GI and/or her colorectal surgeon, Dr. Modesto Price.     In the hospital, she received 3 days of IV Zosyn. Her fever and WBC count resolved, and her vital signs remained stable while on the floors. She received IV Tylenol and IV morphine for pain control. Her blood cultures were followed and showed no growth at 48 hours. Her urine culture grew <49k MRSA, which were deemed contaminated given negative UA and no reports of dysuria while admitted.    While admitted to the hospital, the patient had persistently elevated blood glucose readings. Her Hemoglobin A1C was measured at 12.2% and she was evaluated by the inpatient endocrinology service. She received 16 units of Lantus in the mornings and 5u short-acting insulin TID for pre-meals.  A TSH was also ordered for a palpable thyroid nodule on physical exam, which came back at 0.47.  She will be discharged with 20 units of basal insulin qhs and Metformin 1000mg BID. She received education on insulin administration and should follow up with endocrinology outpatient for further management of T2DM.    The patient improved over the course of admission and is medically stable for discharge with follow-up with her PCP and outpatient endocrinology services.

## 2023-08-22 NOTE — PROGRESS NOTE ADULT - PROBLEM SELECTOR PLAN 5
Hx of colon mass excision in 2021,reportedly benign. Pt reports last colonoscopy 1 year prior, reportedly normal.  - Per colorectal surg, pt can follow up with GI or colorectal surgery outpatient

## 2023-08-22 NOTE — PROGRESS NOTE ADULT - REASON FOR ADMISSION
Pelvic pain radiating to buttock
Pelvic pain radiating to buttock
sepsis
Pelvic pain radiating to buttock

## 2023-08-22 NOTE — PROGRESS NOTE ADULT - PROBLEM SELECTOR PLAN 4
Home medication: Metformin 500mg daily, holding in hospital. A1C = 12.2%  - Endo recs - 16u Lantus qAM + 5u pre-meal  - TSH/T4 ordered per rec  - Will need outpt followup for management of diabetes Home medication: Metformin 500mg daily, holding in hospital. A1C = 12.2%  - Endo recs - 16u Lantus qAM + 5u pre-meal  - TSH/T4 ordered per rec  - Will need outpt followup for management of diabetes  - Endocrine rec for 20u Lantus and 1000mg Metformin BID on discharge

## 2023-08-22 NOTE — DISCHARGE NOTE PROVIDER - NSDCMRMEDTOKEN_GEN_ALL_CORE_FT
metFORMIN 500 mg oral tablet, extended release: 1 tab(s) orally once a day   alcohol swabs: Apply topically to affected area 4 times a day  Basaglar KwikPen 100 units/mL subcutaneous solution: 20 unit(s) subcutaneous once a day (at bedtime)  glucometer (per patient&#x27;s insurance): Test blood sugars four times a day. Dispense #1 glucometer.  Insulin Pen Needles, 4mm: 1 application subcutaneously 4 times a day. ** Use with insulin pen **  lancets: 1 application subcutaneously 4 times a day  MetFORMIN (Eqv-Fortamet) 1000 mg oral tablet, extended release: 1 tab(s) orally 2 times a day  mupirocin 2% topical ointment: Apply topically to affected area 2 times a day Apply to your nares  test strips (per patient&#x27;s insurance): 1 application subcutaneously 4 times a day. ** Compatible with patient&#x27;s glucometer **

## 2023-08-22 NOTE — DIETITIAN INITIAL EVALUATION ADULT - ORAL INTAKE PTA/DIET HISTORY
Patient seen for assessment. Reports overall good appetite PTA. Usually isn't a big eater. States not monitoring carbohydrate intake at home. Was on Metformin. A1c is 12.2%.

## 2023-08-22 NOTE — DIETITIAN INITIAL EVALUATION ADULT - PERTINENT MEDS FT
MEDICATIONS  (STANDING):  dextrose 5%. 1000 milliLiter(s) (50 mL/Hr) IV Continuous <Continuous>  dextrose 5%. 1000 milliLiter(s) (100 mL/Hr) IV Continuous <Continuous>  dextrose 50% Injectable 25 Gram(s) IV Push once  dextrose 50% Injectable 25 Gram(s) IV Push once  dextrose 50% Injectable 12.5 Gram(s) IV Push once  enoxaparin Injectable 40 milliGRAM(s) SubCutaneous every 24 hours  glucagon  Injectable 1 milliGRAM(s) IntraMuscular once  insulin glargine Injectable (LANTUS) 16 Unit(s) SubCutaneous every morning  insulin lispro (ADMELOG) corrective regimen sliding scale   SubCutaneous at bedtime  insulin lispro (ADMELOG) corrective regimen sliding scale   SubCutaneous three times a day before meals  insulin lispro Injectable (ADMELOG) 5 Unit(s) SubCutaneous three times a day before meals  piperacillin/tazobactam IVPB.. 3.375 Gram(s) IV Intermittent every 8 hours  polyethylene glycol 3350 17 Gram(s) Oral every 12 hours  senna 2 Tablet(s) Oral at bedtime  sodium chloride 0.9%. 1000 milliLiter(s) (100 mL/Hr) IV Continuous <Continuous>    MEDICATIONS  (PRN):  dextrose Oral Gel 15 Gram(s) Oral once PRN Blood Glucose LESS THAN 70 milliGRAM(s)/deciliter  melatonin 3 milliGRAM(s) Oral at bedtime PRN Insomnia  morphine  - Injectable 4 milliGRAM(s) IV Push every 6 hours PRN Severe Pain (7 - 10)  morphine  - Injectable 2 milliGRAM(s) IV Push every 4 hours PRN Moderate Pain (4 - 6)  ondansetron Injectable 4 milliGRAM(s) IV Push every 8 hours PRN Nausea and/or Vomiting

## 2023-08-22 NOTE — DISCHARGE NOTE PROVIDER - CARE PROVIDER_API CALL
ABRAHAM DUGAN  [id number] [address]  Phone: ()-  Fax: ()-  Follow Up Time: 2 weeks    Ethel Chao  Phone: [phone]  Fax: (   )    -  Follow Up Time: 1 week

## 2023-08-22 NOTE — DISCHARGE NOTE PROVIDER - NSDCCPCAREPLAN_GEN_ALL_CORE_FT
PRINCIPAL DISCHARGE DIAGNOSIS  Diagnosis: Sepsis  Assessment and Plan of Treatment: You were admitted to the hospital for a fever, elevated white blood cell count, and fast heart rate. You receieved an antibiotic called Zosyn to treat possible infection. While in the hospital, we took cultures of your blood, which did not show any bacteria. Your fever, white blood cell count, and heart rate improved with antibiotics.  Your CT scan showed a degenerating fibroid in your uterus, which is the most likely source of your stomach pain. You can take over-the-counter NSAIDs to help with pain as needed. Please follow the instructions on the bottle and do not exceed the recommended amount. You should also follow up with Ob/Gyn as an outpatient. The colorectal surgeons in the hospital also recommended that you follow up with your colorectal surgeon, Dr. Price, as an outpatient.  You were also given senna and Miralax to help with constipation. You can continue to take these at home as needed for constipation.  Please return to the emergency room if you develop fevers or chills, severe abdominal pain, vomiting, chest pain or difficulty breathing.      SECONDARY DISCHARGE DIAGNOSES  Diagnosis: Type 2 diabetes mellitus with hyperglycemia  Assessment and Plan of Treatment: In the hospital, your blood sugar levels were high and your Hemoglobin A1C was 12.2%. This means that your sugar levels have been high over the last few months. You will be discharged with a higher dose of metformin. You should take 1000mg of metformin twice a day. You will also be discharged with 20 units of insulin, which you should take at night. Please follow up with the endocrinologists outpatient for management of your diabetes.

## 2023-08-22 NOTE — PROGRESS NOTE ADULT - SUBJECTIVE AND OBJECTIVE BOX
History:  patient seen at bedside.  Reports fair appetite.  Denies n/v.  Eating about 50% of meals.  No hypoglycemia. Amenable to starting basal insulin.  Patient was on basal insulin in past.  Reports injecting insulin in right arm every day.  PMD changed to PO for this reason as per patient. Reports nonadherence to metformin.    MEDICATIONS  (STANDING):  dextrose 5%. 1000 milliLiter(s) (100 mL/Hr) IV Continuous <Continuous>  dextrose 5%. 1000 milliLiter(s) (50 mL/Hr) IV Continuous <Continuous>  dextrose 50% Injectable 25 Gram(s) IV Push once  dextrose 50% Injectable 25 Gram(s) IV Push once  dextrose 50% Injectable 12.5 Gram(s) IV Push once  enoxaparin Injectable 40 milliGRAM(s) SubCutaneous every 24 hours  glucagon  Injectable 1 milliGRAM(s) IntraMuscular once  insulin lispro (ADMELOG) corrective regimen sliding scale   SubCutaneous at bedtime  insulin lispro (ADMELOG) corrective regimen sliding scale   SubCutaneous three times a day before meals  insulin lispro Injectable (ADMELOG) 6 Unit(s) SubCutaneous three times a day before meals  piperacillin/tazobactam IVPB.. 3.375 Gram(s) IV Intermittent every 8 hours  polyethylene glycol 3350 17 Gram(s) Oral every 12 hours  senna 2 Tablet(s) Oral at bedtime  sodium chloride 0.9%. 1000 milliLiter(s) (100 mL/Hr) IV Continuous <Continuous>    MEDICATIONS  (PRN):  dextrose Oral Gel 15 Gram(s) Oral once PRN Blood Glucose LESS THAN 70 milliGRAM(s)/deciliter  melatonin 3 milliGRAM(s) Oral at bedtime PRN Insomnia  morphine  - Injectable 4 milliGRAM(s) IV Push every 6 hours PRN Severe Pain (7 - 10)  morphine  - Injectable 2 milliGRAM(s) IV Push every 4 hours PRN Moderate Pain (4 - 6)  ondansetron Injectable 4 milliGRAM(s) IV Push every 8 hours PRN Nausea and/or Vomiting      Allergies    No Known Allergies    Intolerances      Review of Systems:  Constitutional: No fever  Eyes: No blurry vision    ALL OTHER SYSTEMS REVIEWED AND NEGATIVE      PHYSICAL EXAM:  VITALS: T(C): 36.9 (08-22-23 @ 12:05)  T(F): 98.4 (08-22-23 @ 12:05), Max: 98.8 (08-21-23 @ 21:50)  HR: 79 (08-22-23 @ 12:05) (71 - 92)  BP: 134/95 (08-22-23 @ 12:05) (132/78 - 142/77)  RR:  (17 - 18)  SpO2:  (98% - 100%)  Wt(kg): --  GENERAL: NAD, well-developed  EYES: No proptosis, no lid lag, anicteric  HEENT:  Atraumatic, Normocephalic, moist mucous membranes  THYROID: Normal size, no palpable nodules  RESPIRATORY: Clear to auscultation bilaterally; No rales, rhonchi, wheezing  CARDIOVASCULAR: Regular rate and rhythm; No murmurs; no peripheral edema  GI: Soft, nontender, non distended  SKIN: Dry, intact, No rashes or lesions  MUSCULOSKELETAL: Full range of motion, normal strength  NEURO: extraocular movements intact, no tremor  PSYCH: Alert and oriented x 3, normal affect, normal mood      CAPILLARY BLOOD GLUCOSE  POCT Blood Glucose.: 279 mg/dL (22 Aug 2023 12:15)  POCT Blood Glucose.: 227 mg/dL (22 Aug 2023 08:42)  POCT Blood Glucose.: 257 mg/dL (21 Aug 2023 21:34)  POCT Blood Glucose.: 289 mg/dL (21 Aug 2023 18:14)      08-22    137  |  101  |  9   ----------------------------<  215<H>  3.5   |  26  |  0.36<L>    eGFR: 121    Ca    9.5      08-22      Mg     1.90     08-21  Phos  2.5     08-21    TPro  7.5  /  Alb  3.5  /  TBili  0.5  /  DBili  x   /  AST  29  /  ALT  37<H>  /  AlkPhos  76  08-22      A1C with Estimated Average Glucose (08.19.23 @ 03:00)    A1C with Estimated Average Glucose Result: 12.2 %   Estimated Average Glucose: 303

## 2023-08-22 NOTE — DISCHARGE NOTE NURSING/CASE MANAGEMENT/SOCIAL WORK - PATIENT PORTAL LINK FT
You can access the FollowMyHealth Patient Portal offered by [facility] by registering at the following website: [email or web address]. By joining [facility]’s FollowMyHealth portal, you will also be able to view your health information using other applications (apps) compatible with our system.

## 2023-08-22 NOTE — PROGRESS NOTE ADULT - SUBJECTIVE AND OBJECTIVE BOX
note incomplete Progress Note    08-19-23 (3d)    Subjective / Overnight Events :  - No acute events overnight.  - Pt seen and examined at bedside. Pt continues to report feeling well and denies continued abdominal or pelvic pain. She states she had a normal bowel movement last night without discomfort. No dysuria reported. No polyuria or polydipsia. Pt reports no subjective fevers, chills, or weakness. Tolerating PO intake. ROS otherwise negative.    MEDICATIONS  (STANDING):  dextrose 5%. 1000 milliLiter(s) (100 mL/Hr) IV Continuous <Continuous>  dextrose 5%. 1000 milliLiter(s) (50 mL/Hr) IV Continuous <Continuous>  dextrose 50% Injectable 25 Gram(s) IV Push once  dextrose 50% Injectable 12.5 Gram(s) IV Push once  dextrose 50% Injectable 25 Gram(s) IV Push once  enoxaparin Injectable 40 milliGRAM(s) SubCutaneous every 24 hours  glucagon  Injectable 1 milliGRAM(s) IntraMuscular once  insulin glargine Injectable (LANTUS) 16 Unit(s) SubCutaneous every morning  insulin lispro (ADMELOG) corrective regimen sliding scale   SubCutaneous at bedtime  insulin lispro (ADMELOG) corrective regimen sliding scale   SubCutaneous three times a day before meals  insulin lispro Injectable (ADMELOG) 5 Unit(s) SubCutaneous three times a day before meals  piperacillin/tazobactam IVPB.. 3.375 Gram(s) IV Intermittent every 8 hours  polyethylene glycol 3350 17 Gram(s) Oral every 12 hours  senna 2 Tablet(s) Oral at bedtime  sodium chloride 0.9%. 1000 milliLiter(s) (100 mL/Hr) IV Continuous <Continuous>    MEDICATIONS  (PRN):  dextrose Oral Gel 15 Gram(s) Oral once PRN Blood Glucose LESS THAN 70 milliGRAM(s)/deciliter  melatonin 3 milliGRAM(s) Oral at bedtime PRN Insomnia  morphine  - Injectable 4 milliGRAM(s) IV Push every 6 hours PRN Severe Pain (7 - 10)  morphine  - Injectable 2 milliGRAM(s) IV Push every 4 hours PRN Moderate Pain (4 - 6)  ondansetron Injectable 4 milliGRAM(s) IV Push every 8 hours PRN Nausea and/or Vomiting      CAPILLARY BLOOD GLUCOSE  POCT Blood Glucose.: 227 mg/dL (22 Aug 2023 08:42)  POCT Blood Glucose.: 257 mg/dL (21 Aug 2023 21:34)  POCT Blood Glucose.: 289 mg/dL (21 Aug 2023 18:14)  POCT Blood Glucose.: 256 mg/dL (21 Aug 2023 12:29)  POCT Blood Glucose.: 314 mg/dL (21 Aug 2023 09:56)    PHYSICAL EXAM:  Vital Signs Last 24 Hrs  T(C): 36.7 (22 Aug 2023 05:34), Max: 37.1 (21 Aug 2023 21:50)  T(F): 98.1 (22 Aug 2023 05:34), Max: 98.8 (21 Aug 2023 21:50)  HR: 71 (22 Aug 2023 05:34) (71 - 92)  BP: 142/77 (22 Aug 2023 05:34) (129/83 - 142/77)  RR: 17 (22 Aug 2023 05:34) (17 - 18)  SpO2: 100% (22 Aug 2023 05:34) (97% - 100%)  Parameters below as of 22 Aug 2023 05:34  Patient On (Oxygen Delivery Method): room air    General: NAD, non-toxic appearing   HEENT: no scleral icterus  CV: RRR, normal S1 and S2  Lungs: normal respiratory effort. CTAB  Abd: soft, nontender, nondistended  Ext: no edema, 2+ peripheral pulses. +darkened skin on dorsal aspect of feet bilaterally  Psych: AAOx3, appropriate affect   Neuro: grossly non-focal, moving all extremities spontaneously   Skin: no rashes or lesions       LABS:                          13.3   7.64  )-----------( 236      ( 22 Aug 2023 06:34 )             38.9       WBC Trend: 7.64<--, 9.59<--, 12.90<--  Hb Trend: 13.3<--, 13.4<--, 12.6<--, 13.7<--    08-22    137  |  101  |  9   ----------------------------<  215<H>  3.5   |  26  |  0.36<L>    Ca    9.5      22 Aug 2023 06:34  Phos  2.5     08-21  Mg     1.90     08-21    TPro  7.5  /  Alb  3.5  /  TBili  0.5  /  DBili  x   /  AST  29  /  ALT  37<H>  /  AlkPhos  76  08-22          Urinalysis Basic - ( 22 Aug 2023 06:34 )    Color: x / Appearance: x / SG: x / pH: x  Gluc: 215 mg/dL / Ketone: x  / Bili: x / Urobili: x   Blood: x / Protein: x / Nitrite: x   Leuk Esterase: x / RBC: x / WBC x   Sq Epi: x / Non Sq Epi: x / Bacteria: x        Culture - Blood (collected 20 Aug 2023 03:55)  Source: .Blood Blood-Venous  Preliminary Report (21 Aug 2023 11:03):    No growth at 24 hours    Culture - Blood (collected 20 Aug 2023 00:01)  Source: .Blood Blood-Peripheral  Preliminary Report (21 Aug 2023 11:02):    No growth at 24 hours      RADIOLOGY & ADDITIONAL TESTS:   < from: CT Abdomen and Pelvis w/ IV Cont (08.19.23 @ 09:24) >  IMPRESSION:  Low attenuating right intramural abnormality suggesting a degenerating   myoma measuring 4.8 x 5.7 x 3.8 cm in size.    Sigmoid anastomosis identified with evidence suggestive of possible   J-pouch should be correlated with patient's surgical history given the   somewhat atypical anastomotic features      < end of copied text >  < from: US Transvaginal (08.19.23 @ 18:44) >  IMPRESSION:  Enlarged uterus with lipoleiomyoma.  Questionable left hydrosalpinx.      < end of copied text >

## 2023-08-22 NOTE — DIETITIAN INITIAL EVALUATION ADULT - OTHER INFO
53 year old female with a PMH of T2DM, uterine fibroids, colon mass s/p sigmoidectomy (2021) presenting with abdominal and rectal pain, now admitted for management of sepsis with suspected GI/ source with degenerating fibroid on imaging per chart.    Patient reports reduced appetite in house. No GI distress or chewing/swallowing difficulties reported. Has no food allergies, but avoids pork. UBW is 146 lbs. ABW is 147.7 lbs. (8/21) per chart indicating stable weight. No edema or pressure injuries noted per RN flow sheet.    Provided pt with handout Carbohydrate Counting for People with Diabetes. Discussed carbohydrate sources, carbohydrate portions, protein sources, mixed meals, and nutrition label reading. Stressed importance of balanced meals with adequate protein and fiber. Pt was informed of current A1c, goal A1c, and goal fingerstick range.

## 2023-08-22 NOTE — PROGRESS NOTE ADULT - PROBLEM SELECTOR PLAN 1
On admission - Ykit=385.2, Tachycardia, HR= 100s-90s. VSS today. Likely GI or  source.  -VS q4h  -Lactate = 1 on admission  -F/u blood cultures  -F/u urine cultures - prelim with 10-49K gram+  -C/w Zosyn 3.357g q8 On admission - Yntm=167.2, Tachycardia, HR= 100s-90s. VSS today. Likely GI or  source.  -VS q4h  -Lactate = 1 on admission  -F/u blood cultures - no growth at 24h  -F/u urine cultures - 10-49K staph aureus, likely contaminated  -C/w Zosyn 3.357g q8

## 2023-08-22 NOTE — PROGRESS NOTE ADULT - ASSESSMENT
VISHAL AMANDA is a 54YO female with PMH of T2DM, uterine fibroids, colon mass s/p sigmoidectomy (2021) presenting with abdominal and rectal pain, now admitted for management of sepsis with suspected GI/ source with degenerating fibroid on imaging.

## 2023-08-22 NOTE — PROGRESS NOTE ADULT - PROBLEM SELECTOR PROBLEM 2
Bilateral lower abdominal pain
Mild HTN
Bilateral lower abdominal pain
Bilateral lower abdominal pain

## 2023-08-22 NOTE — DIETITIAN INITIAL EVALUATION ADULT - PERTINENT LABORATORY DATA
08-22    137  |  101  |  9   ----------------------------<  215<H>  3.5   |  26  |  0.36<L>    Ca    9.5      22 Aug 2023 06:34  Phos  2.5     08-21  Mg     1.90     08-21    TPro  7.5  /  Alb  3.5  /  TBili  0.5  /  DBili  x   /  AST  29  /  ALT  37<H>  /  AlkPhos  76  08-22  POCT Blood Glucose.: 227 mg/dL (08-22-23 @ 08:42)  A1C with Estimated Average Glucose Result: 12.2 % (08-19-23 @ 03:00)

## 2023-08-25 LAB
CULTURE RESULTS: SIGNIFICANT CHANGE UP
CULTURE RESULTS: SIGNIFICANT CHANGE UP
SPECIMEN SOURCE: SIGNIFICANT CHANGE UP
SPECIMEN SOURCE: SIGNIFICANT CHANGE UP

## 2023-08-30 ENCOUNTER — EMERGENCY (EMERGENCY)
Facility: HOSPITAL | Age: 53
LOS: 1 days | Discharge: HOME | End: 2023-08-30
Attending: EMERGENCY MEDICINE | Admitting: EMERGENCY MEDICINE
Payer: COMMERCIAL

## 2023-08-30 VITALS
HEART RATE: 94 BPM | RESPIRATION RATE: 15 BRPM | SYSTOLIC BLOOD PRESSURE: 154 MMHG | HEIGHT: 65 IN | DIASTOLIC BLOOD PRESSURE: 98 MMHG | TEMPERATURE: 98 F | OXYGEN SATURATION: 100 %

## 2023-08-30 LAB
ALBUMIN SERPL ELPH-MCNC: 4.5 G/DL — SIGNIFICANT CHANGE UP (ref 3.3–5)
ALP SERPL-CCNC: 125 U/L — HIGH (ref 40–120)
ALT FLD-CCNC: 45 U/L — HIGH (ref 4–33)
ANION GAP SERPL CALC-SCNC: 17 MMOL/L — HIGH (ref 7–14)
APTT BLD: 32.1 SEC — SIGNIFICANT CHANGE UP (ref 24.5–35.6)
AST SERPL-CCNC: 23 U/L — SIGNIFICANT CHANGE UP (ref 4–32)
BASOPHILS # BLD AUTO: 0.04 K/UL — SIGNIFICANT CHANGE UP (ref 0–0.2)
BASOPHILS NFR BLD AUTO: 0.3 % — SIGNIFICANT CHANGE UP (ref 0–2)
BILIRUB SERPL-MCNC: 0.5 MG/DL — SIGNIFICANT CHANGE UP (ref 0.2–1.2)
BUN SERPL-MCNC: 11 MG/DL — SIGNIFICANT CHANGE UP (ref 7–23)
CALCIUM SERPL-MCNC: 10 MG/DL — SIGNIFICANT CHANGE UP (ref 8.4–10.5)
CHLORIDE SERPL-SCNC: 94 MMOL/L — LOW (ref 98–107)
CO2 SERPL-SCNC: 24 MMOL/L — SIGNIFICANT CHANGE UP (ref 22–31)
CREAT SERPL-MCNC: 0.42 MG/DL — LOW (ref 0.5–1.3)
EGFR: 117 ML/MIN/1.73M2 — SIGNIFICANT CHANGE UP
EOSINOPHIL # BLD AUTO: 0.09 K/UL — SIGNIFICANT CHANGE UP (ref 0–0.5)
EOSINOPHIL NFR BLD AUTO: 0.7 % — SIGNIFICANT CHANGE UP (ref 0–6)
GLUCOSE SERPL-MCNC: 281 MG/DL — HIGH (ref 70–99)
HCG SERPL-ACNC: <1 MIU/ML — SIGNIFICANT CHANGE UP
HCT VFR BLD CALC: 43.1 % — SIGNIFICANT CHANGE UP (ref 34.5–45)
HGB BLD-MCNC: 14.6 G/DL — SIGNIFICANT CHANGE UP (ref 11.5–15.5)
IANC: 6.86 K/UL — SIGNIFICANT CHANGE UP (ref 1.8–7.4)
IMM GRANULOCYTES NFR BLD AUTO: 0.6 % — SIGNIFICANT CHANGE UP (ref 0–0.9)
INR BLD: 1.18 RATIO — SIGNIFICANT CHANGE UP (ref 0.85–1.18)
LYMPHOCYTES # BLD AUTO: 36.5 % — SIGNIFICANT CHANGE UP (ref 13–44)
LYMPHOCYTES # BLD AUTO: 4.48 K/UL — HIGH (ref 1–3.3)
MCHC RBC-ENTMCNC: 29.4 PG — SIGNIFICANT CHANGE UP (ref 27–34)
MCHC RBC-ENTMCNC: 33.9 GM/DL — SIGNIFICANT CHANGE UP (ref 32–36)
MCV RBC AUTO: 86.9 FL — SIGNIFICANT CHANGE UP (ref 80–100)
MONOCYTES # BLD AUTO: 0.74 K/UL — SIGNIFICANT CHANGE UP (ref 0–0.9)
MONOCYTES NFR BLD AUTO: 6 % — SIGNIFICANT CHANGE UP (ref 2–14)
NEUTROPHILS # BLD AUTO: 6.86 K/UL — SIGNIFICANT CHANGE UP (ref 1.8–7.4)
NEUTROPHILS NFR BLD AUTO: 55.9 % — SIGNIFICANT CHANGE UP (ref 43–77)
NRBC # BLD: 0 /100 WBCS — SIGNIFICANT CHANGE UP (ref 0–0)
NRBC # FLD: 0 K/UL — SIGNIFICANT CHANGE UP (ref 0–0)
PLATELET # BLD AUTO: 360 K/UL — SIGNIFICANT CHANGE UP (ref 150–400)
POTASSIUM SERPL-MCNC: 3.7 MMOL/L — SIGNIFICANT CHANGE UP (ref 3.5–5.3)
POTASSIUM SERPL-SCNC: 3.7 MMOL/L — SIGNIFICANT CHANGE UP (ref 3.5–5.3)
PROT SERPL-MCNC: 8.3 G/DL — SIGNIFICANT CHANGE UP (ref 6–8.3)
PROTHROM AB SERPL-ACNC: 13.3 SEC — HIGH (ref 9.5–13)
RBC # BLD: 4.96 M/UL — SIGNIFICANT CHANGE UP (ref 3.8–5.2)
RBC # FLD: 11.9 % — SIGNIFICANT CHANGE UP (ref 10.3–14.5)
SODIUM SERPL-SCNC: 135 MMOL/L — SIGNIFICANT CHANGE UP (ref 135–145)
TROPONIN T, HIGH SENSITIVITY RESULT: 7 NG/L — SIGNIFICANT CHANGE UP
WBC # BLD: 12.28 K/UL — HIGH (ref 3.8–10.5)
WBC # FLD AUTO: 12.28 K/UL — HIGH (ref 3.8–10.5)

## 2023-08-30 PROCEDURE — 93010 ELECTROCARDIOGRAM REPORT: CPT

## 2023-08-30 PROCEDURE — 99285 EMERGENCY DEPT VISIT HI MDM: CPT

## 2023-08-30 PROCEDURE — 93971 EXTREMITY STUDY: CPT | Mod: 26,LT

## 2023-08-30 RX ORDER — MORPHINE SULFATE 50 MG/1
4 CAPSULE, EXTENDED RELEASE ORAL ONCE
Refills: 0 | Status: DISCONTINUED | OUTPATIENT
Start: 2023-08-30 | End: 2023-08-30

## 2023-08-30 RX ADMIN — MORPHINE SULFATE 4 MILLIGRAM(S): 50 CAPSULE, EXTENDED RELEASE ORAL at 23:54

## 2023-08-30 NOTE — ED PROVIDER NOTE - ATTENDING CONTRIBUTION TO CARE
Attending note:   After face to face evaluation of this patient, I concur with above noted hx, pe, and care plan for this patient.  Mcnally: 53-year-old female with diabetes status post: CVA and fibroids.  Patient was admitted for sepsis last week) discharged 1 week ago.  Patient now presents to the ED with left-sided shoulder and arm pain that began suddenly at 3 PM on day of presentation.  Is worse with movement.  Patient denies any trauma except for IV was placed in that arm previously.  Patient denies any fevers or chills or cough or nausea or vomiting.  Patient denies any numbness or tingling.  On exam patient appears uncomfortable and is minimally tender on the shoulder.  Patient has good range of motion of the shoulder and elbow with some pain.  There is some slight discoloration in the fingertips and hand and minimal edema with some slight appearing of ecchymosis.  Pulses are strong and sensation is normal patient has full range of motion with some pain.  Lungs are clear and heart is regular.  Patient has some risk for coronary disease so we will get EKG and labs.  But given recent instrumentation in that arm would check for DVT.  We if negative consider CT to look for DVT higher up.

## 2023-08-30 NOTE — ED ADULT TRIAGE NOTE - CHIEF COMPLAINT QUOTE
Pt. c/o left-sided chest pain radiating to arm and shoulder that began this morning. endorses intermittent SOB. PHx DM II

## 2023-08-30 NOTE — ED ADULT NURSE NOTE - NSFALLUNIVINTERV_ED_ALL_ED
Bed/Stretcher in lowest position, wheels locked, appropriate side rails in place/Call bell, personal items and telephone in reach/Instruct patient to call for assistance before getting out of bed/chair/stretcher/Non-slip footwear applied when patient is off stretcher/[place] to call system/Physically safe environment - no spills, clutter or unnecessary equipment/Purposeful proactive rounding/Room/bathroom lighting operational, light cord in reach

## 2023-08-30 NOTE — ED PROVIDER NOTE - CLINICAL SUMMARY MEDICAL DECISION MAKING FREE TEXT BOX
Yong Mcnally MD, PGY2  53-year-old female with past medical history of type 2 diabetes, uterine fibroids, history of colonic mass excision in 2021 and admission for sepsis from degenerating uterine fibroid on August 19, 2023 presenting to emergency department with left-sided chest pain radiating to left arm that began today at 3 PM.  Prior to onset of symptoms patient was having no chest pain and no left arm pain.  Patient states pain is most severe in left shoulder, is having difficulty moving secondary to pain.  Denies any trauma to the shoulder/chest wall.  Has not had similar symptoms before in the past.  Pain has been constant.  Patient also noticed slight discoloration in left fingertips.  Denies fever, headache, vision change, shortness of breath, abdominal pain, nausea, vomiting, diarrhea, rash.    Vital signs stable in emergency department.  On exam patient has diffuse tenderness over left shoulder and left antecubital fossa with mild swelling and minor discoloration to left fingertips.  Patient also has decreased  strength likely secondary to pain.  Sensation intact.  Peripheral pulses palpated bilaterally.  Differential including but not limited to upper extremity DVT, vascular injury, lymphadenopathy, peripheral nerve compression, ACS, muscle strain.  Plan to obtain ACS work-up, ultrasound duplex left upper extremity to evaluate for DVT.  If ultrasound negative will consider CT chest and left upper extremity to further evaluate.

## 2023-08-30 NOTE — ED PROVIDER NOTE - PROGRESS NOTE DETAILS
Yong Mcnally MD, PGY2  CT chest and upper extremity not showing any acute pathology to explain patient's symptoms.  Patient still having left shoulder pain and limited range of motion.  Will give additional Tylenol, Toradol.  Discussed with radiologist who states she did not see any obvious signs of adhesive capsulitis but the preferred modality is MR for that diagnosis, no effusion/fluid collection noted.  Will send ESR/CRP to assess for potential septic joint however with lack of effusion on CT, sudden onset of symptoms, lack of fever, septic joint unlikely at this time. Yong Mcnally MD, PGY2  Troponin stable, CRP 5.6.  Unlikely septic joint at this time.  Possible musculoskeletal injury/nerve impingement.  Patient will follow-up with orthopedics.  Will advised to take Tylenol, ibuprofen for symptoms.  We will also Rx cyclobenzaprine to the pharmacy.  Patient advised medication can make her drowsy and recommended taking it at night.  Patient in agreement with plan.  Answered all questions and gave return precautions.

## 2023-08-30 NOTE — ED ADULT NURSE NOTE - OBJECTIVE STATEMENT
Pt. presents to 29 c/o left chest pain that radiated down her left arm began at 3pm today. Pt. was also unable to move the arm d/t the pain. Also noted to have swelling to her left hand/fingers. Pt. can move fingers minimally but moving the shoulder/elbow caused extreme pain. PMHx T2DM HTN thyroid dx, fibroids, has left breast lump that they are watching. [id number] labs sent medicated per order. pending USr

## 2023-08-30 NOTE — ED PROVIDER NOTE - PATIENT PORTAL LINK FT
You can access the FollowMyHealth Patient Portal offered by [facility] by registering at the following website: [email or web address]. By joining [facility]s FollowMyHealth portal, you will also be able to view your health information using other applications (apps) compatible with our system.

## 2023-08-30 NOTE — ED PROVIDER NOTE - NSFOLLOWUPINSTRUCTIONS_ED_ALL_ED_FT
We evaluated you in the emergency room and it appears that you have a muscle strain.     We recommend that you rest, ice and take tylenol(650 mg up to three times a day)/motrin(600 mg up to three times a day) for your symptoms.     After 48 hours please use heat on the area that is hurting.     If you still have persistent pain after 5-7 days after the injury please be re-evaluated as there could be a more severe diagnosis than just a strain. If you develop numbness, weakness, change in color of your extremities (turn blue or white), worsening pain please seek immediate medical care for repeat evaluation.    Chest Pain    Chest pain can be caused by many different conditions which may or may not be dangerous. Causes include heartburn, lung infections, heart attack, blood clot in lungs, skin infections, strain or damage to muscle, cartilage, or bones, etc. In addition to a history and physical examination, an electrocardiogram (ECG) or other lab tests may have been performed to determine the cause of your chest pain. Follow up with your primary care provider or with a cardiologist as instructed.     SEEK IMMEDIATE MEDICAL CARE IF YOU HAVE ANY OF THE FOLLOWING SYMPTOMS: worsening chest pain, coughing up blood, unexplained back/neck/jaw pain, severe abdominal pain, dizziness or lightheadedness, fainting, shortness of breath, sweaty or clammy skin, vomiting, or racing heart beat. These symptoms may represent a serious problem that is an emergency. Do not wait to see if the symptoms will go away. Get medical help right away. Call 911 and do not drive yourself to the hospital.

## 2023-08-30 NOTE — ED PROVIDER NOTE - PHYSICAL EXAMINATION
Gen: in visible discomfort, not moving left upper extremity.   HEENT: EOMI, no nasal discharge, mucous membranes moist  CV: RRR, +S1/S2, no M/R/G, 2+ radial pulses b/l  Resp: CTAB, no W/R/R, no accessory muscle use, no increased work of breathing  GI: Abdomen soft non-distended, NTTP  MSK: No midline spinal tenderness, full ROM of neck. +tenderness to palpation diffusely over anterior aspect of left shoulder and left axillary area. Decreased  strength left hand likely 2/2 pain. intact sensation, radial pulses 2+ b/l. Mild discoloration fingertips of left hand. No overlying skin changes.  no LE edema  Neuro: A&Ox4, following commands  Psych: appropriate mood

## 2023-08-31 VITALS
OXYGEN SATURATION: 100 % | HEART RATE: 75 BPM | RESPIRATION RATE: 16 BRPM | DIASTOLIC BLOOD PRESSURE: 80 MMHG | TEMPERATURE: 98 F | SYSTOLIC BLOOD PRESSURE: 125 MMHG

## 2023-08-31 LAB
CRP SERPL-MCNC: 5.6 MG/L — HIGH
ERYTHROCYTE [SEDIMENTATION RATE] IN BLOOD: 33 MM/HR — HIGH (ref 4–25)
TROPONIN T, HIGH SENSITIVITY RESULT: 7 NG/L — SIGNIFICANT CHANGE UP

## 2023-08-31 PROCEDURE — 71045 X-RAY EXAM CHEST 1 VIEW: CPT | Mod: 26

## 2023-08-31 PROCEDURE — 73201 CT UPPER EXTREMITY W/DYE: CPT | Mod: 26,LT,MA

## 2023-08-31 PROCEDURE — 71260 CT THORAX DX C+: CPT | Mod: 26,MA

## 2023-08-31 RX ORDER — CYCLOBENZAPRINE HYDROCHLORIDE 10 MG/1
1 TABLET, FILM COATED ORAL
Qty: 15 | Refills: 0
Start: 2023-08-31 | End: 2023-09-04

## 2023-08-31 RX ORDER — FUROSEMIDE 40 MG
40 TABLET ORAL ONCE
Refills: 0 | Status: DISCONTINUED | OUTPATIENT
Start: 2023-08-31 | End: 2023-08-31

## 2023-08-31 RX ORDER — KETOROLAC TROMETHAMINE 30 MG/ML
15 SYRINGE (ML) INJECTION ONCE
Refills: 0 | Status: DISCONTINUED | OUTPATIENT
Start: 2023-08-31 | End: 2023-08-31

## 2023-08-31 RX ORDER — ACETAMINOPHEN 500 MG
1000 TABLET ORAL ONCE
Refills: 0 | Status: COMPLETED | OUTPATIENT
Start: 2023-08-31 | End: 2023-08-31

## 2023-08-31 RX ORDER — LIDOCAINE 4 G/100G
1 CREAM TOPICAL ONCE
Refills: 0 | Status: COMPLETED | OUTPATIENT
Start: 2023-08-31 | End: 2023-08-31

## 2023-08-31 RX ADMIN — Medication 15 MILLIGRAM(S): at 04:32

## 2023-08-31 RX ADMIN — Medication 400 MILLIGRAM(S): at 04:32

## 2023-08-31 RX ADMIN — LIDOCAINE 1 PATCH: 4 CREAM TOPICAL at 05:54

## 2023-10-02 ENCOUNTER — APPOINTMENT (OUTPATIENT)
Dept: ENDOCRINOLOGY | Facility: CLINIC | Age: 53
End: 2023-10-02

## 2023-12-13 NOTE — PROGRESS NOTE ADULT - TIME-BASED
Tendon Sheath    Date/Time: 12/13/2023 8:00 AM    Performed by: Jaime Oswald MD  Authorized by: Jaime Oswald MD    Consent Done?:  Yes (Verbal)  Indications:  Pain  Timeout: prior to procedure the correct patient, procedure, and site was verified    Prep: patient was prepped and draped in usual sterile fashion      Local anesthesia used?: Yes    Local anesthetic:  Lidocaine 2% without epinephrine  Anesthetic total (ml):  0.5    Location:  Index finger  Site:  R index flexor tendon sheath  Ultrasonic guidance for needle placement?: No    Needle size:  25 G  Approach:  Volar  Medications:  40 mg triamcinolone acetonide 40 mg/mL    
Tendon Sheath    Date/Time: 12/13/2023 8:00 AM    Performed by: Jaime Oswald MD  Authorized by: Jaime Oswald MD    Consent Done?:  Yes (Verbal)  Indications:  Pain  Timeout: prior to procedure the correct patient, procedure, and site was verified    Prep: patient was prepped and draped in usual sterile fashion      Local anesthesia used?: Yes    Local anesthetic:  Lidocaine 2% without epinephrine  Anesthetic total (ml):  0.5    Location:  Small finger  Site:  L small flexor tendon sheath  Ultrasonic guidance for needle placement?: No    Needle size:  25 G  Approach:  Volar  Medications:  40 mg triamcinolone acetonide 40 mg/mL    
75

## 2024-03-08 NOTE — CONSULT NOTE ADULT - SUBJECTIVE AND OBJECTIVE BOX
Colorectal Surgery Consult      Consulting attending: ***THIS NOT IS NOT COMPLETE. PENDING ATTENDING ATTESTATION***      HPI: Chloe Treviño is a 53 y.o. woman with history of DM, uterine fibroids, sigmoidectomy (~2000, unclear if pathology was malignant), and chronic constipation who presented to the ED on 8/19 complaining of 1 week of intermittent lower abdominal pain. Patient localizes her pain to her suprapubic area and also states that she feels pain near her rectum. The patient denies any associated nausea, vomiting, anorexia, chills, or diaphoresis. The patient states that she had a subjective fever at home the day prior to presentation.     The patient states that she has a life-long history of constipation. Some weeks, she may only have 2 bowel movements. The patient states that her last bowel movement was small and occurred 1 day prior to presentation. The patient denies any exacerbation or alleviation of her symptoms after the BM.     The patient denies any history of similar pain, but states that, since the time of her sigmoidectomy, she has felt some lower abdominal pain whenever she coughs or sneezes. The patient denied any associated abdominal bulging.       PAST MEDICAL HISTORY:  Type 2 diabetes mellitus      PAST SURGICAL HISTORY:  No significant past surgical history          MEDICATIONS:        ALLERGIES:  No Known Allergies        VITALS & I/Os:  Vital Signs Last 24 Hrs  T(C): 37.2 (19 Aug 2023 20:17), Max: 37.6 (19 Aug 2023 04:32)  T(F): 98.9 (19 Aug 2023 20:17), Max: 99.7 (19 Aug 2023 04:32)  HR: 90 (19 Aug 2023 20:17) (72 - 93)  BP: 151/88 (19 Aug 2023 20:17) (131/89 - 155/93)  BP(mean): --  RR: 17 (19 Aug 2023 20:17) (16 - 18)  SpO2: 97% (19 Aug 2023 20:17) (97% - 100%)    Parameters below as of 19 Aug 2023 20:17  Patient On (Oxygen Delivery Method): room air        I&O's Summary        PHYSICAL EXAM:  General: No acute distress  Respiratory: Nonlabored  Cardiovascular: RRR  Abdominal: Soft, nondistended, nontender. No rebound or guarding. No organomegaly, no palpable mass.  Extremities: Warm      LABS:                        13.7   10.60 )-----------( 209      ( 19 Aug 2023 03:00 )             40.4     08-19    135  |  98  |  10  ----------------------------<  298<H>  3.9   |  25  |  0.33<L>    Ca    9.7      19 Aug 2023 03:00    TPro  7.4  /  Alb  4.3  /  TBili  0.7  /  DBili  x   /  AST  26  /  ALT  44<H>  /  AlkPhos  116  08-19    Lactate:  08-19 @ 04:50  1.7              Urinalysis Basic - ( 19 Aug 2023 03:00 )    Color: x / Appearance: x / SG: x / pH: x  Gluc: 298 mg/dL / Ketone: x  / Bili: x / Urobili: x   Blood: x / Protein: x / Nitrite: x   Leuk Esterase: x / RBC: x / WBC x   Sq Epi: x / Non Sq Epi: x / Bacteria: x          IMAGING:   Addended by: MARIBEL ANTONIO on: 3/8/2024 09:29 AM     Modules accepted: Orders                                                                                                 Colorectal Surgery Consult      Consulting attending:       HPI: Chloe Treviño is a 53 y.o. woman with history of DM, uterine fibroids, sigmoidectomy (~2000, unclear if pathology was malignant), and chronic constipation who presented to the ED on 8/19 complaining of 1 week of intermittent lower abdominal pain. Patient localizes her pain to her suprapubic area and also states that she feels pain near her rectum. The patient denies any associated nausea, vomiting, anorexia, chills, or diaphoresis. The patient states that she had a subjective fever at home the day prior to presentation.     The patient states that she has a life-long history of constipation. Some weeks, she may only have 2 bowel movements. The patient states that her last bowel movement was small and occurred 1 day prior to presentation. The patient denies any exacerbation or alleviation of her symptoms after the BM.     The patient denies any history of similar pain, but states that, since the time of her sigmoidectomy, she has felt some lower abdominal pain whenever she coughs or sneezes. The patient denied any associated abdominal bulging.       PAST MEDICAL HISTORY:  Type 2 diabetes mellitus      PAST SURGICAL HISTORY:  No significant past surgical history          MEDICATIONS:        ALLERGIES:  No Known Allergies        VITALS & I/Os:  Vital Signs Last 24 Hrs  T(C): 37.2 (19 Aug 2023 20:17), Max: 37.6 (19 Aug 2023 04:32)  T(F): 98.9 (19 Aug 2023 20:17), Max: 99.7 (19 Aug 2023 04:32)  HR: 90 (19 Aug 2023 20:17) (72 - 93)  BP: 151/88 (19 Aug 2023 20:17) (131/89 - 155/93)  BP(mean): --  RR: 17 (19 Aug 2023 20:17) (16 - 18)  SpO2: 97% (19 Aug 2023 20:17) (97% - 100%)    Parameters below as of 19 Aug 2023 20:17  Patient On (Oxygen Delivery Method): room air        I&O's Summary        PHYSICAL EXAM:  General: No acute distress  Respiratory: Nonlabored  Cardiovascular: RRR  Abdominal: Soft, nondistended, nontender. No rebound or guarding. No organomegaly, no palpable mass.  Extremities: Warm      LABS:                        13.7   10.60 )-----------( 209      ( 19 Aug 2023 03:00 )             40.4     08-19    135  |  98  |  10  ----------------------------<  298<H>  3.9   |  25  |  0.33<L>    Ca    9.7      19 Aug 2023 03:00    TPro  7.4  /  Alb  4.3  /  TBili  0.7  /  DBili  x   /  AST  26  /  ALT  44<H>  /  AlkPhos  116  08-19    Lactate:  08-19 @ 04:50  1.7              Urinalysis Basic - ( 19 Aug 2023 03:00 )    Color: x / Appearance: x / SG: x / pH: x  Gluc: 298 mg/dL / Ketone: x  / Bili: x / Urobili: x   Blood: x / Protein: x / Nitrite: x   Leuk Esterase: x / RBC: x / WBC x   Sq Epi: x / Non Sq Epi: x / Bacteria: x          IMAGING:

## 2024-03-18 ENCOUNTER — APPOINTMENT (OUTPATIENT)
Dept: SURGICAL ONCOLOGY | Facility: CLINIC | Age: 54
End: 2024-03-18
Payer: COMMERCIAL

## 2024-03-18 PROCEDURE — 99203 OFFICE O/P NEW LOW 30 MIN: CPT | Mod: 25

## 2024-03-18 PROCEDURE — 10021 FNA BX W/O IMG GDN 1ST LES: CPT

## 2024-03-31 ENCOUNTER — NON-APPOINTMENT (OUTPATIENT)
Age: 54
End: 2024-03-31

## 2024-05-15 PROBLEM — E04.2 MULTIPLE THYROID NODULES: Status: ACTIVE | Noted: 2024-05-15

## 2024-05-20 ENCOUNTER — APPOINTMENT (OUTPATIENT)
Dept: SURGICAL ONCOLOGY | Facility: CLINIC | Age: 54
End: 2024-05-20
Payer: COMMERCIAL

## 2024-05-20 DIAGNOSIS — E04.2 NONTOXIC MULTINODULAR GOITER: ICD-10-CM

## 2024-05-20 PROCEDURE — 99214 OFFICE O/P EST MOD 30 MIN: CPT

## 2024-05-21 ENCOUNTER — NON-APPOINTMENT (OUTPATIENT)
Age: 54
End: 2024-05-21

## 2024-05-21 NOTE — HISTORY OF PRESENT ILLNESS
[de-identified] : Chloe is a pleasant 53 year-old female here for follow up of thyroid nodules.   1/25/2024 Thyroid US ([facility])- likely multinodular goiter with the following dominant nodules: -Isthmus extending into R lobe: 3.7 cm, significantly increased, TIRADS 4--> US-FNA -R upper pole: 1.5 cm, grossly stable, TIRADS 4 -R mid: 2 cm, grossly stable, TIRADS 4 -L lower pole: 3.5 cm, grossly stable, TIRADS 4  3/18/2024 Thyroid Isthmus FNA: benign findings (Category II) adenomatous nodular goiter

## 2024-05-21 NOTE — ASSESSMENT
[FreeTextEntry1] :  All medical entries were at my, Dr. Omar Gonzalez, direction.  I have reviewed the chart and agree that the record accurately reflects my personal performance of the history, physical exam, assessment and plan.  Our office nurse practitioner was present for the duration of the office visit.

## 2024-06-14 ENCOUNTER — APPOINTMENT (OUTPATIENT)
Dept: SURGICAL ONCOLOGY | Facility: CLINIC | Age: 54
End: 2024-06-14
Payer: COMMERCIAL

## 2024-06-14 VITALS — HEIGHT: 63 IN | BODY MASS INDEX: 26.22 KG/M2

## 2024-06-14 VITALS
OXYGEN SATURATION: 99 % | RESPIRATION RATE: 16 BRPM | DIASTOLIC BLOOD PRESSURE: 99 MMHG | HEART RATE: 93 BPM | SYSTOLIC BLOOD PRESSURE: 151 MMHG | WEIGHT: 148 LBS

## 2024-06-14 DIAGNOSIS — E04.2 NONTOXIC MULTINODULAR GOITER: ICD-10-CM

## 2024-06-14 PROCEDURE — 99215 OFFICE O/P EST HI 40 MIN: CPT

## 2024-06-17 LAB
ACTH STIM ACTH BASELINE: 7.5 PG/ML
CORTIS SERPL-MCNC: 8.7 UG/DL
T3 SERPL-MCNC: 103 NG/DL
T4 FREE SERPL-MCNC: 1.5 NG/DL
TSH SERPL-ACNC: 0.25 UIU/ML

## 2024-06-21 PROBLEM — E04.2 MULTINODULAR GOITER: Status: ACTIVE | Noted: 2024-05-15

## 2024-06-21 NOTE — ASSESSMENT
[FreeTextEntry1] : S/p thyroid FNA isthmus 3/2024 = (category II) adenomatous nodular goiter I had a long discussion with the pt regarding her diagnosis, prognosis and all management options Will schedule for thyroidectomy since she is symptomatic  Risk of injury to the recurrent laryngeal nerve and parathyroid glands was discussed at length with the patient who wants to proceed with surgery  Surgical procedure discussed in detail Will need medical clearance  Will order blood work now for thyroid function study tests  Will refer to Dr. Dipak Etseves of endocrinology prior to sx for eval  All questions answered

## 2024-06-21 NOTE — ADDENDUM
[FreeTextEntry1] : I, Erica Gilbert, acted solely as a scribe for Dr. Zak Renteria on this date 06/14/2024.

## 2024-06-21 NOTE — CONSULT LETTER
[Dear  ___] : Dear  [unfilled], [Consult Letter:] : I had the pleasure of evaluating your patient, [unfilled]. [Please see my note below.] : Please see my note below. [Sincerely,] : Sincerely, [FreeTextEntry3] : Zak Renteria MD FACS

## 2024-06-21 NOTE — PHYSICAL EXAM
[Normal Neck Lymph Nodes] : normal neck lymph nodes  [Normal Supraclavicular Lymph Nodes] : normal supraclavicular lymph nodes [Normal Groin Lymph Nodes] : normal groin lymph nodes [Normal Axillary Lymph Nodes] : normal axillary lymph nodes [Normal] : oriented to person, place and time, with appropriate affect [de-identified] : us shows large goiter right thyroid

## 2024-06-21 NOTE — HISTORY OF PRESENT ILLNESS
[de-identified] : Patient is a 54 y/o F who was referred by Dr. Omar Gonzalez for thyroid nodules.  She is feeling constantly tired and fatigue. She is not taking any hormone medications now. Denies any radiation exposure in the past.  S/p FNA nodule right isthmus performed on 3/18/24 with Dr. Gonzalez, path= benign findings (category II), adenomatous nodular goiter.   US thyroid 1/25/24- Enlarged and heterogeneous thyroid gland containing multiple nodules most likely representing multinodular goiter. 4 of the most suspicious nodules were evaluated as per ACR recc. Of these nodules, a 3.7 cm solid nodule in the isthmus extending to the right lobe appears to have significantly increased in size, recc needle bx - TI-RADS 4  No family hx of thyroid cancer. Sister had thyroid sx ~20 yrs ago.  PMHx: DM

## 2024-07-26 ENCOUNTER — APPOINTMENT (OUTPATIENT)
Dept: SURGICAL ONCOLOGY | Facility: HOSPITAL | Age: 54
End: 2024-07-26
